# Patient Record
Sex: MALE | Race: BLACK OR AFRICAN AMERICAN | Employment: OTHER | URBAN - METROPOLITAN AREA
[De-identification: names, ages, dates, MRNs, and addresses within clinical notes are randomized per-mention and may not be internally consistent; named-entity substitution may affect disease eponyms.]

---

## 2017-11-12 ENCOUNTER — APPOINTMENT (OUTPATIENT)
Dept: GENERAL RADIOLOGY | Age: 77
End: 2017-11-12
Attending: PHYSICIAN ASSISTANT
Payer: MEDICARE

## 2017-11-12 ENCOUNTER — HOSPITAL ENCOUNTER (EMERGENCY)
Age: 77
Discharge: HOME | End: 2017-11-12
Attending: EMERGENCY MEDICINE
Payer: MEDICARE

## 2017-11-12 VITALS
TEMPERATURE: 98.1 F | HEIGHT: 69 IN | HEART RATE: 67 BPM | SYSTOLIC BLOOD PRESSURE: 134 MMHG | OXYGEN SATURATION: 98 % | DIASTOLIC BLOOD PRESSURE: 57 MMHG | BODY MASS INDEX: 25.11 KG/M2 | WEIGHT: 169.53 LBS | RESPIRATION RATE: 16 BRPM

## 2017-11-12 DIAGNOSIS — R53.83 FATIGUE, UNSPECIFIED TYPE: Primary | ICD-10-CM

## 2017-11-12 DIAGNOSIS — R05.9 COUGH: ICD-10-CM

## 2017-11-12 LAB
ALBUMIN SERPL-MCNC: 3.6 G/DL (ref 3.5–5)
ALBUMIN/GLOB SERPL: 1 {RATIO} (ref 1.1–2.2)
ALP SERPL-CCNC: 107 U/L (ref 45–117)
ALT SERPL-CCNC: 12 U/L (ref 12–78)
ANION GAP SERPL CALC-SCNC: 9 MMOL/L (ref 5–15)
AST SERPL-CCNC: 15 U/L (ref 15–37)
BASOPHILS # BLD: 0 K/UL (ref 0–0.1)
BASOPHILS NFR BLD: 0 % (ref 0–1)
BILIRUB SERPL-MCNC: 0.4 MG/DL (ref 0.2–1)
BUN SERPL-MCNC: 11 MG/DL (ref 6–20)
BUN/CREAT SERPL: 9 (ref 12–20)
CALCIUM SERPL-MCNC: 9.1 MG/DL (ref 8.5–10.1)
CHLORIDE SERPL-SCNC: 109 MMOL/L (ref 97–108)
CO2 SERPL-SCNC: 27 MMOL/L (ref 21–32)
CREAT SERPL-MCNC: 1.26 MG/DL (ref 0.7–1.3)
EOSINOPHIL # BLD: 0.2 K/UL (ref 0–0.4)
EOSINOPHIL NFR BLD: 3 % (ref 0–7)
ERYTHROCYTE [DISTWIDTH] IN BLOOD BY AUTOMATED COUNT: 14.3 % (ref 11.5–14.5)
GLOBULIN SER CALC-MCNC: 3.5 G/DL (ref 2–4)
GLUCOSE SERPL-MCNC: 86 MG/DL (ref 65–100)
HCT VFR BLD AUTO: 39 % (ref 36.6–50.3)
HGB BLD-MCNC: 13.4 G/DL (ref 12.1–17)
LYMPHOCYTES # BLD: 2.7 K/UL (ref 0.8–3.5)
LYMPHOCYTES NFR BLD: 32 % (ref 12–49)
MCH RBC QN AUTO: 33.7 PG (ref 26–34)
MCHC RBC AUTO-ENTMCNC: 34.4 G/DL (ref 30–36.5)
MCV RBC AUTO: 98 FL (ref 80–99)
MONOCYTES # BLD: 0.7 K/UL (ref 0–1)
MONOCYTES NFR BLD: 8 % (ref 5–13)
NEUTS SEG # BLD: 4.7 K/UL (ref 1.8–8)
NEUTS SEG NFR BLD: 57 % (ref 32–75)
PLATELET # BLD AUTO: 164 K/UL (ref 150–400)
POTASSIUM SERPL-SCNC: 4.5 MMOL/L (ref 3.5–5.1)
PROT SERPL-MCNC: 7.1 G/DL (ref 6.4–8.2)
RBC # BLD AUTO: 3.98 M/UL (ref 4.1–5.7)
SODIUM SERPL-SCNC: 145 MMOL/L (ref 136–145)
TSH SERPL DL<=0.05 MIU/L-ACNC: 1.54 UIU/ML (ref 0.36–3.74)
WBC # BLD AUTO: 8.3 K/UL (ref 4.1–11.1)

## 2017-11-12 PROCEDURE — 99282 EMERGENCY DEPT VISIT SF MDM: CPT

## 2017-11-12 PROCEDURE — 84443 ASSAY THYROID STIM HORMONE: CPT | Performed by: PHYSICIAN ASSISTANT

## 2017-11-12 PROCEDURE — 36415 COLL VENOUS BLD VENIPUNCTURE: CPT | Performed by: PHYSICIAN ASSISTANT

## 2017-11-12 PROCEDURE — 85025 COMPLETE CBC W/AUTO DIFF WBC: CPT | Performed by: PHYSICIAN ASSISTANT

## 2017-11-12 PROCEDURE — 80053 COMPREHEN METABOLIC PANEL: CPT | Performed by: PHYSICIAN ASSISTANT

## 2017-11-12 PROCEDURE — 71020 XR CHEST PA LAT: CPT
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
## 2017-11-12 NOTE — ED PROVIDER NOTES
HPI Comments: Florence Sagastume is a 78 y.o. male  who presents by private vehicle to ER with c/o Patient presents with:  Cough. Patient presents with 4 week history of cough, decreased appetite and weight loss. PAtient concerned he may have pneumonia. Has been taking mucinex and robitussin with no relief. PAtient denies fever or chills. Denies nausea, vomiting or diarrhea. He specifically denies any fevers, chills, nausea, vomiting, chest pain, shortness of breath, headache, rash, diarrhea, abdominal pain, urinary/bowel changes, sweating or weight loss. PCP: None   PMHx significant for: Past Medical History:  10/3/2014: CKD (chronic kidney disease) stage 3, GFR 30-5*  No date: Other ill-defined conditions      Comment: recent cold, taking benadryl for sinus                congestion  No date: Tobacco abuse   PSHx significant for: Past Surgical History:  No date: ABDOMEN SURGERY PROC UNLISTED  No date: HX HERNIA REPAIR  No date: HX OTHER SURGICAL      Comment: colonoscopy  Social Hx: Tobacco use: Smoking status: Current Every Day Smoker                                                   Packs/day: 0.25      Years: 0.00      Smokeless status: Never Used                      ; EtOH use: The patient states he drinks 0 per week.; Illicit Drug use: Allergies:  No Known Allergies    There are no other complaints, changes or physical findings at this time. Patient is a 78 y.o. male presenting with cough. The history is provided by the patient. Cough   This is a recurrent problem. The current episode started more than 1 week ago. The problem occurs constantly. The problem has not changed since onset. The cough is non-productive. There has been no fever.  Pertinent negatives include no chest pain, no chills, no sweats, no weight loss, no eye redness, no ear congestion, no ear pain, no headaches, no rhinorrhea, no sore throat, no myalgias, no shortness of breath, no wheezing, no nausea, no vomiting and no confusion. He has tried nothing for the symptoms. He is not a smoker. His past medical history is significant for pneumonia. Past Medical History:   Diagnosis Date    CKD (chronic kidney disease) stage 3, GFR 30-59 ml/min 10/3/2014    Other ill-defined conditions     recent cold, taking benadryl for sinus congestion    Tobacco abuse        Past Surgical History:   Procedure Laterality Date    ABDOMEN SURGERY PROC UNLISTED      HX HERNIA REPAIR      HX OTHER SURGICAL      colonoscopy         Family History:   Problem Relation Age of Onset    Other       patient does not know       Social History     Social History    Marital status:      Spouse name: N/A    Number of children: N/A    Years of education: N/A     Occupational History    Not on file. Social History Main Topics    Smoking status: Current Every Day Smoker     Packs/day: 0.25    Smokeless tobacco: Never Used    Alcohol use No    Drug use: No    Sexual activity: Not on file     Other Topics Concern    Not on file     Social History Narrative    No narrative on file         ALLERGIES: Review of patient's allergies indicates no known allergies. Review of Systems   Constitutional: Positive for activity change, appetite change and unexpected weight change. Negative for chills and weight loss. HENT: Negative. Negative for ear pain, rhinorrhea and sore throat. Eyes: Negative. Negative for redness. Respiratory: Positive for cough. Negative for shortness of breath and wheezing. Cardiovascular: Negative. Negative for chest pain. Gastrointestinal: Negative. Negative for nausea and vomiting. Endocrine: Negative. Genitourinary: Negative. Musculoskeletal: Negative. Negative for myalgias. Skin: Negative. Allergic/Immunologic: Negative. Neurological: Negative. Negative for headaches. Hematological: Negative. Psychiatric/Behavioral: Negative. Negative for confusion.    All other systems reviewed and are negative. Vitals:    11/12/17 1538   BP: 133/66   Pulse: 66   Resp: 16   Temp: 98.1 °F (36.7 °C)   SpO2: 99%   Weight: 76.9 kg (169 lb 8.5 oz)   Height: 5' 9\" (1.753 m)            Physical Exam   Constitutional: He is oriented to person, place, and time. Vital signs are normal. He appears well-developed and well-nourished. Non-toxic appearance. He does not have a sickly appearance. He does not appear ill. No distress. HENT:   Head: Normocephalic and atraumatic. Right Ear: External ear normal.   Left Ear: External ear normal.   Mouth/Throat: Oropharynx is clear and moist. No oropharyngeal exudate. Eyes: Conjunctivae and EOM are normal. Pupils are equal, round, and reactive to light. Right eye exhibits no discharge. Left eye exhibits no discharge. No scleral icterus. Neck: Normal range of motion. Neck supple. No tracheal deviation present. No thyromegaly present. Cardiovascular: Normal rate, regular rhythm, normal heart sounds and intact distal pulses. No murmur heard. Pulmonary/Chest: Effort normal and breath sounds normal. No respiratory distress. He has no wheezes. He has no rales. Abdominal: Soft. Bowel sounds are normal. He exhibits no distension. There is no tenderness. There is no rebound and no guarding. Musculoskeletal: Normal range of motion. He exhibits no edema or tenderness. Lymphadenopathy:     He has no cervical adenopathy. Neurological: He is alert and oriented to person, place, and time. No cranial nerve deficit. Coordination normal.   Skin: Skin is warm. No rash noted. No erythema. Psychiatric: He has a normal mood and affect. His behavior is normal. Judgment and thought content normal.   Nursing note and vitals reviewed. MDM  Number of Diagnoses or Management Options  Cough:   Fatigue, unspecified type:   Diagnosis management comments: Assesment/Plan- 78 y.o.  Patient presents with:  Cough  differential includes: pneumonia, electrolyte imbalance, thyroid abnormality. Labs and imaging reviewed with no acute findings. Recommend PCP follow up. Patient educated on reasons to return to the ED.          Amount and/or Complexity of Data Reviewed  Clinical lab tests: ordered and reviewed  Tests in the radiology section of CPT®: ordered and reviewed  Tests in the medicine section of CPT®: reviewed and ordered      ED Course       Procedures

## 2017-12-06 ENCOUNTER — OFFICE VISIT (OUTPATIENT)
Dept: FAMILY MEDICINE CLINIC | Age: 77
End: 2017-12-06

## 2017-12-06 VITALS
HEIGHT: 69 IN | HEART RATE: 73 BPM | TEMPERATURE: 98.3 F | WEIGHT: 170 LBS | SYSTOLIC BLOOD PRESSURE: 127 MMHG | DIASTOLIC BLOOD PRESSURE: 64 MMHG | RESPIRATION RATE: 16 BRPM | BODY MASS INDEX: 25.18 KG/M2

## 2017-12-06 DIAGNOSIS — H25.9 SENILE CATARACT OF RIGHT EYE, UNSPECIFIED AGE-RELATED CATARACT TYPE: Primary | ICD-10-CM

## 2017-12-06 DIAGNOSIS — J30.9 ALLERGIC RHINITIS, UNSPECIFIED CHRONICITY, UNSPECIFIED SEASONALITY, UNSPECIFIED TRIGGER: ICD-10-CM

## 2017-12-06 RX ORDER — AZITHROMYCIN 500 MG/1
TABLET, FILM COATED ORAL DAILY
COMMUNITY
End: 2017-12-06 | Stop reason: ALTCHOICE

## 2017-12-06 RX ORDER — FLUTICASONE PROPIONATE 50 MCG
2 SPRAY, SUSPENSION (ML) NASAL DAILY
Qty: 1 BOTTLE | Refills: 3 | Status: SHIPPED | OUTPATIENT
Start: 2017-12-06 | End: 2018-07-16

## 2017-12-06 NOTE — PROGRESS NOTES
Preoperative Evaluation    Date of Exam: 2017    Migdalia Mitchell is a 78 y.o. male (:1940) who presents for preoperative evaluation. Procedure/Surgery: R cataract extraction and implant    Date of Procedure/Surgery: 17    Surgeon: Flaca Subramanian    [facility]    Primary Physician: None    Latex Allergy: no    Recent use of: No recent use of aspirin (ASA), NSAIDS or steroids    Tetanus up to date: thinks that he has had this    Anesthesia Complications: None    History of abnormal bleeding : None    History of Blood Transfusions: no    Health Care Directive or Living Will: no    He able to climb a flight of stairs without chest pain or severe SOB      Allergies- reviewed:   No Known Allergies    Medications- reviewed:   Current Outpatient Prescriptions   Medication Sig    fluticasone (FLONASE) 50 mcg/actuation nasal spray 2 Sprays by Both Nostrils route daily. No current facility-administered medications for this visit. Past Medical History- reviewed:  Past Medical History:   Diagnosis Date    CKD (chronic kidney disease) stage 3, GFR 30-59 ml/min 10/3/2014    Other ill-defined conditions(799.89)     recent cold, taking benadryl for sinus congestion    Tobacco abuse        Past Surgical History- reviewed:   Past Surgical History:   Procedure Laterality Date    ABDOMEN SURGERY PROC UNLISTED      HX HERNIA REPAIR      HX OTHER SURGICAL      colonoscopy       Social History- reviewed:  Social History     Social History    Marital status: SINGLE     Spouse name: N/A    Number of children: N/A    Years of education: N/A     Occupational History    Not on file.      Social History Main Topics    Smoking status: Current Every Day Smoker     Packs/day: 0.25    Smokeless tobacco: Never Used    Alcohol use No    Drug use: No    Sexual activity: Not on file     Other Topics Concern    Not on file     Social History Narrative Immunizations- reviewed:   Immunization History   Administered Date(s) Administered    Pneumococcal Polysaccharide (PPSV-23) 10/06/2014    ZZZ-RETIRED (DO NOT USE) Pneumococcal Vaccine (Unspecified Type) 10/08/2011         REVIEW OF SYSTEMS:  CONSTITUTIONAL: Denies: fever, fatigue, weight loss  EYES: Denies: decreased vision  ENT: Denies: sore throat  CARDIOVASCULAR: Denies: chest pain  RESPIRATORY: Denies: shortness of breath  ENDOCRINE: Denies: palpitations  GI: Denies: abdominal pain  : Denies: dysuria  NEURO: Denies: headache  MUSCULOSKELETAL: Denies: back pain  SKIN: Denies: rash  PSYCH: Denies: depression      EXAM:   Visit Vitals    /64    Pulse 73    Temp 98.3 °F (36.8 °C) (Oral)    Resp 16    Ht 5' 9\" (1.753 m)    Wt 170 lb (77.1 kg)    BMI 25.1 kg/m2       General appearance - alert, well appearing, and in no distress  Eyes - pupils equal and reactive, extraocular eye movements intact  Ears - bilateral TM's and external ear canals normal  Nose - normal and patent, no erythema, discharge or polyps  Mouth - mucous membranes moist, pharynx normal without lesions  Neck - supple, no significant adenopathy  Chest - clear to auscultation, no wheezes, rales or rhonchi, symmetric air entry  Heart - normal rate, regular rhythm, normal S1, S2, no murmurs  Abdomen - soft, nontender, nondistended  Neurological - alert, oriented, normal speech, no focal findings or movement disorder noted  Musculoskeletal - no joint tenderness, deformity or swelling  Extremities - no pedal edema  Skin - normal coloration and turgor, no rashes      DIAGNOSTICS: no diagnostic testing necessary      IMPRESSION:     Pt presents for preoperative evaluation for cataract surgery and is an acceptable candidate for this low risk surgery.        Sal Messer MD  Family Medicine Resident

## 2017-12-06 NOTE — PROGRESS NOTES
Chief Complaint   Patient presents with    Pre-op Exam     cataract--right eye     1. Have you been to the ER, urgent care clinic since your last visit? Hospitalized since your last visit? No    2. Have you seen or consulted any other health care providers outside of the [address] [place] [address] since your last visit? Include any pap smears or colon screening.  No

## 2017-12-06 NOTE — MR AVS SNAPSHOT
Visit Information Date & Time Provider Department Dept. Phone Encounter #  
 2017  8:35 AM Natalie Thapa, [address] [facility] [phone] [id number] Upcoming Health Maintenance Date Due DTaP/Tdap/Td series (1 - Tdap) 1961 ZOSTER VACCINE AGE 60> 2002 GLAUCOMA SCREENING Q2Y 2007 MEDICARE YEARLY EXAM 2007 Influenza Age 5 to Adult 2017 Allergies as of 2017  Review Complete On: 2017 By: Tabitha Callaway MD  
 No Known Allergies Current Immunizations  Reviewed on 2012 Name Date Pneumococcal Polysaccharide (PPSV-23) 10/6/2014  6:19 PM  
 ZZZ-RETIRED (DO NOT USE) Pneumococcal Vaccine (Unspecified Type) 10/8/2011 Not reviewed this visit You Were Diagnosed With   
  
 Codes Comments Allergic rhinitis, unspecified chronicity, unspecified seasonality, unspecified trigger    -  Primary ICD-10-CM: J30.9 ICD-9-CM: 477.9 Vitals BP Pulse Temp Resp Height(growth percentile) Weight(growth percentile) 127/64 73 98.3 °F (36.8 °C) (Oral) 16 5' 9\" (1.753 m) 170 lb (77.1 kg) BMI Smoking Status 25.1 kg/m2 Current Every Day Smoker Vitals History BMI and BSA Data Body Mass Index Body Surface Area  
 25.1 kg/m 2 1.94 m 2 Preferred Pharmacy Pharmacy Name Phone [facility] [place], [address] [place] [facility] [phone] Your Updated Medication List  
  
   
This list is accurate as of: 17  9:06 AM.  Always use your most recent med list.  
  
  
  
  
 fluticasone 50 mcg/actuation nasal spray Commonly known as:  Jero Metro 2 Sprays by Both Nostrils route daily. Prescriptions Sent to Pharmacy Refills  
 fluticasone (FLONASE) 50 mcg/actuation nasal spray 3 Si Sprays by Both Nostrils route daily.   
 Class: Normal  
 Pharmacy: [facility] [address]  #: [phone] Route: Both Nostrils Patient Instructions Eating Healthy Foods: Care Instructions Your Care Instructions Eating healthy foods can help lower your risk for disease. Healthy food gives you energy and keeps your heart strong, your brain active, your muscles working, and your bones strong. A healthy diet includes a variety of foods from the basic food groups: grains, vegetables, fruits, milk and milk products, and meat and beans. Some people may eat more of their favorite foods from only one food group and, as a result, miss getting the nutrients they need. So, it is important to pay attention not only to what you eat but also to what you are missing from your diet. You can eat a healthy, balanced diet by making a few small changes. Follow-up care is a key part of your treatment and safety. Be sure to make and go to all appointments, and call your doctor if you are having problems. It's also a good idea to know your test results and keep a list of the medicines you take. How can you care for yourself at home? Look at what you eat · Keep a food diary for a week or two and record everything you eat or drink. Track the number of servings you eat from each food group. · For a balanced diet every day, eat a variety of: ¨ 6 or more ounce-equivalents of grains, such as cereals, breads, crackers, rice, or pasta, every day. An ounce-equivalent is 1 slice of bread, 1 cup of ready-to-eat cereal, or ½ cup of cooked rice, cooked pasta, or cooked cereal. 
¨ 2½ cups of vegetables, especially: § Dark-green vegetables such as broccoli and spinach. § Orange vegetables such as carrots and sweet potatoes. § Dry beans (such as heck and kidney beans) and peas (such as lentils). ¨ 2 cups of fresh, frozen, or canned fruit. A small apple or 1 banana or orange equals 1 cup. ¨ 3 cups of nonfat or low-fat milk, yogurt, or other milk products. ¨ 5½ ounces of meat and beans, such as chicken, fish, lean meat, beans, nuts, and seeds. One egg, 1 tablespoon of peanut butter, ½ ounce nuts or seeds, or ¼ cup of cooked beans equals 1 ounce of meat. · Learn how to read food labels for serving sizes and ingredients. Fast-food and convenience-food meals often contain few or no fruits or vegetables. Make sure you eat some fruits and vegetables to make the meal more nutritious. · Look at your food diary. For each food group, add up what you have eaten and then divide the total by the number of days. This will give you an idea of how much you are eating from each food group. See if you can find some ways to change your diet to make it more healthy. Start small · Do not try to make dramatic changes to your diet all at once. You might feel that you are missing out on your favorite foods and then be more likely to fail. · Start slowly, and gradually change your habits. Try some of the following: ¨ Use whole wheat bread instead of white bread. ¨ Use nonfat or low-fat milk instead of whole milk. ¨ Eat brown rice instead of white rice, and eat whole wheat pasta instead of white-flour pasta. ¨ Try low-fat cheeses and low-fat yogurt. ¨ Add more fruits and vegetables to meals and have them for snacks. ¨ Add lettuce, tomato, cucumber, and onion to sandwiches. ¨ Add fruit to yogurt and cereal. 
Enjoy food · You can still eat your favorite foods. You just may need to eat less of them. If your favorite foods are high in fat, salt, and sugar, limit how often you eat them, but do not cut them out entirely. · Eat a wide variety of foods. Make healthy choices when eating out · The type of restaurant you choose can help you make healthy choices. Even fast-food chains are now offering more low-fat or healthier choices on the menu. · Choose smaller portions, or take half of your meal home. · When eating out, try: ¨ A veggie pizza with a whole wheat crust or grilled chicken (instead of sausage or pepperoni). ¨ Pasta with roasted vegetables, grilled chicken, or marinara sauce instead of cream sauce. ¨ A vegetable wrap or grilled chicken wrap. ¨ Broiled or poached food instead of fried or breaded items. Make healthy choices easy · Buy packaged, prewashed, ready-to-eat fresh vegetables and fruits, such as baby carrots, salad mixes, and chopped or shredded broccoli and cauliflower. · Buy packaged, presliced fruits, such as melon or pineapple. · Choose 100% fruit or vegetable juice instead of soda. Limit juice intake to 4 to 6 oz (½ to ¾ cup) a day. · Blend low-fat yogurt, fruit juice, and canned or frozen fruit to make a smoothie for breakfast or a snack. Where can you learn more? Go to http://leslie-josue.info/. Enter T756 in the search box to learn more about \"Eating Healthy Foods: Care Instructions. \" Current as of: May 12, 2017 Content Version: 11.4 © 2338-7131 [employer]. Care instructions adapted under license by Not iT (which disclaims liability or warranty for this information). If you have questions about a medical condition or this instruction, always ask your healthcare professional. [place] [address] any warranty or liability for your use of this information. Introducing [facility] & HE[facility]! Desmond Ross introduces [employer] patient portal. Now you can access parts of your medical record, email your doctor's office, and request medication refills online. 1. In your internet browser, go to [email or web address] ([email or web address] 2. Click on the First Time User? Click Here link in the Sign In box. You will see the New Member Sign Up page. 3. Enter your [employer] Access Code exactly as it appears below. You will not need to use this code after youve completed the sign-up process.  If you do not sign up before the expiration date, you must request a new code. · [employer] Access Code: [email or web address] Expires: 2/10/2018  6:17 PM 
 
4. Enter the last four digits of your Social Security Number (xxxx) and Date of Birth (mm/dd/yyyy) as indicated and click Submit. You will be taken to the next sign-up page. 5. Create a [employer] ID. This will be your [employer] login ID and cannot be changed, so think of one that is secure and easy to remember. 6. Create a [employer] password. You can change your password at any time. 7. Enter your Password Reset Question and Answer. This can be used at a later time if you forget your password. 8. Enter your e-mail address. You will receive e-mail notification when new information is available in [address] 9. Click Sign Up. You can now view and download portions of your medical record. 10. Click the Download Summary menu link to download a portable copy of your medical information. If you have questions, please visit the Frequently Asked Questions section of the [employer] website. Remember, [employer] is NOT to be used for urgent needs. For medical emergencies, dial 911. Now available from your iPhone and Android! Please provide this summary of care documentation to your next provider. Your primary care clinician is listed as NONE. If you have any questions after today's visit, please call [phone].

## 2017-12-06 NOTE — PATIENT INSTRUCTIONS

## 2018-03-05 ENCOUNTER — HOSPITAL ENCOUNTER (EMERGENCY)
Age: 78
Discharge: HOME | End: 2018-03-05
Attending: EMERGENCY MEDICINE
Payer: MEDICARE

## 2018-03-05 VITALS
OXYGEN SATURATION: 100 % | RESPIRATION RATE: 17 BRPM | DIASTOLIC BLOOD PRESSURE: 62 MMHG | WEIGHT: 171.74 LBS | HEART RATE: 72 BPM | SYSTOLIC BLOOD PRESSURE: 125 MMHG | BODY MASS INDEX: 25.44 KG/M2 | HEIGHT: 69 IN | TEMPERATURE: 98.4 F

## 2018-03-05 DIAGNOSIS — G50.1 ATYPICAL FACE PAIN: Primary | ICD-10-CM

## 2018-03-05 DIAGNOSIS — K04.7 DENTAL ABSCESS: ICD-10-CM

## 2018-03-05 PROCEDURE — 74011250637 HC RX REV CODE- 250/637: Performed by: EMERGENCY MEDICINE

## 2018-03-05 PROCEDURE — 99283 EMERGENCY DEPT VISIT LOW MDM: CPT

## 2018-03-05 PROCEDURE — 74011000250 HC RX REV CODE- 250: Performed by: EMERGENCY MEDICINE

## 2018-03-05 RX ORDER — CLINDAMYCIN HYDROCHLORIDE 150 MG/1
300 CAPSULE ORAL
Status: COMPLETED | OUTPATIENT
Start: 2018-03-05 | End: 2018-03-05

## 2018-03-05 RX ORDER — CLINDAMYCIN HYDROCHLORIDE 300 MG/1
300 CAPSULE ORAL 4 TIMES DAILY
Qty: 28 CAP | Refills: 0 | Status: SHIPPED | OUTPATIENT
Start: 2018-03-05 | End: 2018-07-16

## 2018-03-05 RX ORDER — HYDROCODONE BITARTRATE AND ACETAMINOPHEN 5; 325 MG/1; MG/1
1 TABLET ORAL
Qty: 12 TAB | Refills: 0 | Status: SHIPPED | OUTPATIENT
Start: 2018-03-05 | End: 2018-07-16

## 2018-03-05 RX ADMIN — LIDOCAINE HYDROCHLORIDE: 20 SOLUTION ORAL; TOPICAL at 17:42

## 2018-03-05 RX ADMIN — CLINDAMYCIN HYDROCHLORIDE 300 MG: 150 CAPSULE ORAL at 17:42

## 2018-03-05 NOTE — DISCHARGE INSTRUCTIONS
We hope that we have addressed all of your medical concerns. The examination and treatment you received in the Emergency Department were for an emergent problem and were not intended as complete care. It is important that you follow up with your healthcare provider(s) for ongoing care. If your symptoms worsen or do not improve as expected, and you are unable to reach your usual health care provider(s), you should return to the Emergency Department. Today's healthcare is undergoing tremendous change, and patient satisfaction surveys are one of the many tools to assess the quality of medical care. You may receive a survey from the [employer] regarding your experience in the Emergency Department. I hope that your experience has been completely positive, particularly the medical care that I provided. As such, please participate in the survey; anything less than excellent does not meet my expectations or intentions. [facility] and [address] [place] [address] participate in nationally recognized quality of care measures. If your blood pressure is greater than 120/80, as reported below, we urge that you seek medical care to address the potential of high blood pressure, commonly known as hypertension. Hypertension can be hereditary or can be caused by certain medical conditions, pain, stress, or \"white coat syndrome. \"       Please make an appointment with your health care provider(s) for follow up of your Emergency Department visit. VITALS:   Patient Vitals for the past 8 hrs:   Temp Pulse Resp BP SpO2   03/05/18 1548 98.4 °F (36.9 °C) 72 17 125/62 100 %          Thank you for allowing us to provide you with medical care today. We realize that you have many choices for your emergency care needs. Please choose us in the future for any continued health care needs.       Nathalia Chaudhari MD    [facility]   Office: [phone]            No results found for this or any previous visit (from the past 24 hour(s)). No results found. Abscessed Tooth: Care Instructions  Your Care Instructions    An abscessed tooth is a tooth that has a pocket of pus in the tissues around it. Pus forms when the body tries to fight an infection caused by bacteria. If the pus cannot drain, it forms an abscess. An abscessed tooth can cause red, swollen gums and throbbing pain, especially when you chew. You may have a bad taste in your mouth and a fever, and your jaw may swell. Damage to the tooth, untreated tooth decay, or gum disease can cause an abscessed tooth. An abscessed tooth needs to be treated by a dental professional right away. If it is not treated, the infection could spread to other parts of your body. Your dentist will give you antibiotics to stop the infection. He or she may make a hole in the tooth or cut open (aminata) the abscess inside your mouth so that the infection can drain, which should relieve your pain. You may need to have a root canal treatment, which tries to save your tooth by taking out the infected pulp and replacing it with a healing medicine and/or a filling. If these treatments do not work, your tooth may have to be removed. Follow-up care is a key part of your treatment and safety. Be sure to make and go to all appointments, and call your doctor if you are having problems. It's also a good idea to know your test results and keep a list of the medicines you take. How can you care for yourself at home? · Reduce pain and swelling in your face and jaw by putting ice or a cold pack on the outside of your cheek for 10 to 20 minutes at a time. Put a thin cloth between the ice and your skin. · Take pain medicines exactly as directed. ¨ If the doctor gave you a prescription medicine for pain, take it as prescribed.   ¨ If you are not taking a prescription pain medicine, ask your doctor if you can take an over-the-counter medicine. · Take your antibiotics as directed. Do not stop taking them just because you feel better. You need to take the full course of antibiotics. To prevent tooth abscess  · Brush and floss every day, and have regular dental checkups. · Eat a healthy diet, and avoid sugary foods and drinks. · Do not smoke, use e-cigarettes with nicotine, or use spit tobacco. Tobacco and nicotine slow your ability to heal. Tobacco also increases your risk for gum disease and cancer of the mouth and throat. If you need help quitting, talk to your doctor about stop-smoking programs and medicines. These can increase your chances of quitting for good. When should you call for help? Call 911 anytime you think you may need emergency care. For example, call if:  ? · You have trouble breathing. ?Call your doctor now or seek immediate medical care if:  ? · You have new or worse symptoms of infection, such as:  ¨ Increased pain, swelling, warmth, or redness. ¨ Red streaks leading from the area. ¨ Pus draining from the area. ¨ A fever. ? Watch closely for changes in your health, and be sure to contact your doctor if:  ? · You do not get better as expected. Where can you learn more? Go to http://leslie-josue.info/. Enter N341 in the search box to learn more about \"Abscessed Tooth: Care Instructions. \"  Current as of: May 12, 2017  Content Version: 11.4  © 8895-8413 [employer]. Care instructions adapted under license by [employer] (which disclaims liability or warranty for this information). If you have questions about a medical condition or this instruction, always ask your healthcare professional. [place] [address] any warranty or liability for your use of this information.

## 2018-07-16 ENCOUNTER — HOSPITAL ENCOUNTER (EMERGENCY)
Age: 78
Discharge: HOME | End: 2018-07-16
Attending: EMERGENCY MEDICINE | Admitting: EMERGENCY MEDICINE
Payer: MEDICARE

## 2018-07-16 VITALS
WEIGHT: 165 LBS | RESPIRATION RATE: 14 BRPM | TEMPERATURE: 98.5 F | HEIGHT: 69 IN | BODY MASS INDEX: 24.44 KG/M2 | OXYGEN SATURATION: 99 % | HEART RATE: 80 BPM | DIASTOLIC BLOOD PRESSURE: 56 MMHG | SYSTOLIC BLOOD PRESSURE: 128 MMHG

## 2018-07-16 DIAGNOSIS — L02.212 ABSCESS OF BACK: ICD-10-CM

## 2018-07-16 DIAGNOSIS — L72.3 INFECTED SEBACEOUS CYST OF SKIN: Primary | ICD-10-CM

## 2018-07-16 DIAGNOSIS — L08.9 INFECTED SEBACEOUS CYST OF SKIN: Primary | ICD-10-CM

## 2018-07-16 DIAGNOSIS — F17.200 NICOTINE DEPENDENCE, UNCOMPLICATED, UNSPECIFIED NICOTINE PRODUCT TYPE: ICD-10-CM

## 2018-07-16 PROCEDURE — 74011000250 HC RX REV CODE- 250: Performed by: PHYSICIAN ASSISTANT

## 2018-07-16 PROCEDURE — 90715 TDAP VACCINE 7 YRS/> IM: CPT | Performed by: PHYSICIAN ASSISTANT

## 2018-07-16 PROCEDURE — 74011250636 HC RX REV CODE- 250/636: Performed by: PHYSICIAN ASSISTANT

## 2018-07-16 PROCEDURE — 75810000289 HC I&D ABSCESS SIMP/COMP/MULT

## 2018-07-16 PROCEDURE — 90471 IMMUNIZATION ADMIN: CPT

## 2018-07-16 PROCEDURE — 99282 EMERGENCY DEPT VISIT SF MDM: CPT

## 2018-07-16 RX ORDER — DOXYCYCLINE HYCLATE 100 MG
100 TABLET ORAL 2 TIMES DAILY
Qty: 20 TAB | Refills: 0 | Status: SHIPPED | OUTPATIENT
Start: 2018-07-16 | End: 2018-07-26

## 2018-07-16 RX ORDER — TRAMADOL HYDROCHLORIDE 50 MG/1
50 TABLET ORAL
Qty: 10 TAB | Refills: 0 | Status: SHIPPED | OUTPATIENT
Start: 2018-07-16 | End: 2019-01-06

## 2018-07-16 RX ORDER — BUPIVACAINE HYDROCHLORIDE 5 MG/ML
5 INJECTION, SOLUTION EPIDURAL; INTRACAUDAL ONCE
Status: COMPLETED | OUTPATIENT
Start: 2018-07-16 | End: 2018-07-16

## 2018-07-16 RX ADMIN — TETANUS TOXOID, REDUCED DIPHTHERIA TOXOID AND ACELLULAR PERTUSSIS VACCINE, ADSORBED 0.5 ML: 5; 2.5; 8; 8; 2.5 SUSPENSION INTRAMUSCULAR at 14:52

## 2018-07-16 RX ADMIN — BUPIVACAINE HYDROCHLORIDE 25 MG: 5 INJECTION, SOLUTION EPIDURAL; INTRACAUDAL; PERINEURAL at 14:39

## 2018-07-16 NOTE — ED PROVIDER NOTES
HPI Comments: Demetris Tabor is a 78 y.o. male who presents ambulatory to the ED with a c/o abscess to his upper back x weeks. Pt notes he has no insurance and could not see his pcp. He states it has been worse x 4 days. He is unsure of his last tdap. He specifically denies any fevers, chills, nausea, vomiting, chest pain, shortness of breath, headache, rash, abd pain, dysuria, diarrhea, sweating or weight loss. PCP: None  PMHx significant for: Past Medical History:  10/3/2014: CKD (chronic kidney disease) stage 3, GFR 30-5*  No date: Other ill-defined conditions(799.89)      Comment: recent cold, taking benadryl for sinus                congestion  No date: Tobacco abuse  PSHx significant for: Past Surgical History:  No date: ABDOMEN SURGERY PROC UNLISTED  No date: HX HERNIA REPAIR  No date: HX OTHER SURGICAL      Comment: colonoscopy  Social Hx: Tobacco: 1/3 ppd  EtOH: social Illicit drug use: denies     There are no further complaints or symptoms at this time. The history is provided by the patient. Past Medical History:   Diagnosis Date    CKD (chronic kidney disease) stage 3, GFR 30-59 ml/min 10/3/2014    Other ill-defined conditions(799.89)     recent cold, taking benadryl for sinus congestion    Tobacco abuse        Past Surgical History:   Procedure Laterality Date    ABDOMEN SURGERY PROC UNLISTED      HX HERNIA REPAIR      HX OTHER SURGICAL      colonoscopy         Family History:   Problem Relation Age of Onset    Other Other      patient does not know       Social History     Social History    Marital status: SINGLE     Spouse name: N/A    Number of children: N/A    Years of education: N/A     Occupational History    Not on file.      Social History Main Topics    Smoking status: Current Every Day Smoker     Packs/day: 0.25    Smokeless tobacco: Never Used    Alcohol use No    Drug use: No    Sexual activity: Not on file     Other Topics Concern    Not on file Social History Narrative         ALLERGIES: Review of patient's allergies indicates no known allergies. Review of Systems   Constitutional: Negative for chills and fever. HENT: Negative for congestion, rhinorrhea, sneezing and sore throat. Eyes: Negative for redness and visual disturbance. Respiratory: Negative for shortness of breath. Cardiovascular: Negative for chest pain and leg swelling. Gastrointestinal: Negative for abdominal pain, nausea and vomiting. Genitourinary: Negative for difficulty urinating and frequency. Musculoskeletal: Negative for back pain, myalgias and neck stiffness. Skin: Positive for rash and wound. Neurological: Negative for dizziness, syncope, weakness and headaches. Hematological: Negative for adenopathy. Vitals:    07/16/18 1357   BP: 128/56   Pulse: 80   Resp: 14   Temp: 98.5 °F (36.9 °C)   SpO2: 99%   Weight: 74.8 kg (165 lb)   Height: 5' 9\" (1.753 m)            Physical Exam   Constitutional: He is oriented to person, place, and time. He appears well-developed and well-nourished. No distress. HENT:   Head: Normocephalic and atraumatic. Right Ear: External ear normal.   Left Ear: External ear normal.   Eyes: EOM are normal. Pupils are equal, round, and reactive to light. Neck: Neck supple. Cardiovascular: Normal rate, regular rhythm, normal heart sounds and intact distal pulses. Exam reveals no gallop and no friction rub. No murmur heard. Pulmonary/Chest: Effort normal and breath sounds normal. No stridor. No respiratory distress. He has no wheezes. He has no rales. He exhibits no tenderness. Abdominal: Soft. Bowel sounds are normal. He exhibits no distension and no mass. There is no tenderness. There is no rebound and no guarding. Musculoskeletal: Normal range of motion. He exhibits no edema, tenderness or deformity. Neurological: He is alert and oriented to person, place, and time. No cranial nerve deficit.  Coordination normal. Skin: Rash noted. There is erythema. No pallor. 50 cent sized abscess/ infected sebaceous cyst to upper back TTP with fluctuance no surrounding erythema, swelling or drainage. Psychiatric: He has a normal mood and affect. His behavior is normal.   Nursing note and vitals reviewed. MDM  Number of Diagnoses or Management Options     Amount and/or Complexity of Data Reviewed  Review and summarize past medical records: yes  Independent visualization of images, tracings, or specimens: yes    Patient Progress  Patient progress: stable        ED Course       Procedures    2:37 PM  Discussed pt, sx, hx and current findings with Fredi Doty MD. He is in agreement with plan. Will update tdap and I&D abscess  Yareli Paredes. LUCIANA Melton    Procedure Note - Incision and Drainage:   3:29 PM  Performed by: Yareli Paredes. LUCIANA Melton  Complexity: complex  Skin prepped with surecleans. Sterile field established. Anesthesia achieved with 5 mLs of Bupivacaine 0.5% without epinephrine using a local infiltration. Abscess to back was incised with # 11 blade, and 5 mLs of purulent bloody drainage was expressed. Wound probed and irrigated. Area was packed using 1/4 inch iodoform gauze. Sterile dressing applied. Estimated blood loss: less than 2mL  The procedure took 17 minutes, and pt tolerated well. Yareli Paredes. LUCIANA Melton    LABORATORY TESTS:  No results found for this or any previous visit (from the past 12 hour(s)). IMAGING RESULTS:    No results found. MEDICATIONS GIVEN:  Medications   bupivacaine (PF) (MARCAINE) 0.5 % (5 mg/mL) injection 25 mg (not administered)   diph,Pertuss(AC),Tet Vac-PF (BOOSTRIX) suspension 0.5 mL (0.5 mL IntraMUSCular Given 7/16/18 8987)       IMPRESSION:  1. Infected sebaceous cyst of skin    2. Abscess of back    3. Nicotine dependence, uncomplicated, unspecified nicotine product type        PLAN:  1.    Current Discharge Medication List      START taking these medications    Details doxycycline (VIBRA-TABS) 100 mg tablet Take 1 Tab by mouth two (2) times a day for 10 days. Qty: 20 Tab, Refills: 0      traMADol (ULTRAM) 50 mg tablet Take 1 Tab by mouth every six (6) hours as needed for Pain. Max Daily Amount: 200 mg. Indications: Pain  Qty: 10 Tab, Refills: 0    Associated Diagnoses: Abscess of back; Infected sebaceous cyst of skin           2. Follow-up Information     Follow up With Details Comments Contact Info    [facility] EMERGENCY DEPT Schedule an appointment as soon as possible for a visit 2 days for recheck and packing removal Castillo Bossman [address]i [phone] Schedule an appointment as soon as possible for a visit 2 days for recheck and packing removal Nuno Roscoe [address]  [phone]        Return to ED if worse         3:31 PM  Pt has been reexamined. Pt has no new complaints, changes or physical findings. Care plan outlined and precautions discussed. All available results were reviewed with pt. All medications were reviewed with pt. All of pt's questions and concerns were addressed. Pt agrees to F/U as instructed and agrees to return to ED upon further deterioration. Pt is ready to go home.   RADHA Mathias

## 2018-07-16 NOTE — DISCHARGE INSTRUCTIONS
Skin Abscess: Care Instructions  Your Care Instructions    A skin abscess is a bacterial infection that forms a pocket of pus. A boil is a kind of skin abscess. The doctor may have cut an opening in the abscess so that the pus can drain out. You may have gauze in the cut so that the abscess will stay open and keep draining. You may need antibiotics. You will need to follow up with your doctor to make sure the infection has gone away. The doctor has checked you carefully, but problems can develop later. If you notice any problems or new symptoms, get medical treatment right away. Follow-up care is a key part of your treatment and safety. Be sure to make and go to all appointments, and call your doctor if you are having problems. It's also a good idea to know your test results and keep a list of the medicines you take. How can you care for yourself at home? · Apply warm and dry compresses, a heating pad set on low, or a hot water bottle 3 or 4 times a day for pain. Keep a cloth between the heat source and your skin. · If your doctor prescribed antibiotics, take them as directed. Do not stop taking them just because you feel better. You need to take the full course of antibiotics. · Take pain medicines exactly as directed. ¨ If the doctor gave you a prescription medicine for pain, take it as prescribed. ¨ If you are not taking a prescription pain medicine, ask your doctor if you can take an over-the-counter medicine. · Keep your bandage clean and dry. Change the bandage whenever it gets wet or dirty, or at least one time a day. · If the abscess was packed with gauze:  ¨ Keep follow-up appointments to have the gauze changed or removed. If the doctor instructed you to remove the gauze, gently pull out all of the gauze when your doctor tells you to. ¨ After the gauze is removed, soak the area in warm water for 15 to 20 minutes 2 times a day, until the wound closes. When should you call for help?   Call your doctor now or seek immediate medical care if:    · You have signs of worsening infection, such as:  ¨ Increased pain, swelling, warmth, or redness. ¨ Red streaks leading from the infected skin. ¨ Pus draining from the wound. ¨ A fever.    Watch closely for changes in your health, and be sure to contact your doctor if:    · You do not get better as expected. Where can you learn more? Go to http://leslie-josue.info/. Enter L048 in the search box to learn more about \"Skin Abscess: Care Instructions. \"  Current as of: May 10, 2017  Content Version: 11.7  © 3662-2917 [employer]. Care instructions adapted under license by [employer] (which disclaims liability or warranty for this information). If you have questions about a medical condition or this instruction, always ask your healthcare professional. [address] any warranty or liability for your use of this information. Epidermoid Cyst: Care Instructions  Your Care Instructions  An epidermoid (say \"rr-sab-JZG-moyd\") cyst is a lump just under the skin. These cysts can form when a hair follicle becomes blocked. They are common in acne and may occur on the face, neck, back, and genitals. However, they can form anywhere on the body. These cysts are not cancer and do not lead to cancer. They tend not to hurt, but they can sometimes become swollen and painful. They also may break open (rupture) and cause scarring. These cysts sometimes do not cause problems and may not need treatment. If you have a cyst that is swollen and hurts, your doctor may inject it with a medicine to help it heal. But it is more likely that a painful cyst will need to be removed. Your doctor will give you a shot of numbing medicine and cut into the cyst to drain it or remove it. This makes the symptoms go away. Follow-up care is a key part of your treatment and safety.  Be sure to make and go to all appointments, and call your doctor if you are having problems. It's also a good idea to know your test results and keep a list of the medicines you take. How can you care for yourself at home? · Do not squeeze the cyst or poke it with a needle to open it. This can cause swelling, redness, and infection. · Always have a doctor look at any new lumps you get to make sure that they are not serious. When should you call for help? Watch closely for changes in your health, and be sure to contact your doctor if:    · You have a fever, redness, or swelling after you get a shot of medicine in the cyst.     · You see or feel a new lump on your skin. Where can you learn more? Go to http://leslie-josue.info/. Enter H910 in the search box to learn more about \"Epidermoid Cyst: Care Instructions. \"  Current as of: October 5, 2017  Content Version: 11.7  © 1879-0045 [employer] Care instructions adapted under license by [employer] (which disclaims liability or warranty for this information). If you have questions about a medical condition or this instruction, always ask your healthcare professional. [address] any warranty or liability for your use of this information. Learning About Benefits From Quitting Smoking  How does quitting smoking make you healthier? If you're thinking about quitting smoking, you may have a few reasons to be smoke-free. Your health may be one of them. · When you quit smoking, you lower your risks for cancer, lung disease, heart attack, stroke, blood vessel disease, and blindness from macular degeneration. · When you're smoke-free, you get sick less often, and you heal faster. You are less likely to get colds, flu, bronchitis, and pneumonia. · As a nonsmoker, you may find that your mood is better and you are less stressed. When and how will you feel healthier?   Quitting has real health benefits that start from day 1 of being smoke-free. And the longer you stay smoke-free, the healthier you get and the better you feel. The first hours  · After just 20 minutes, your blood pressure and heart rate go down. That means there's less stress on your heart and blood vessels. · Within 12 hours, the level of carbon monoxide in your blood drops back to normal. That makes room for more oxygen. With more oxygen in your body, you may notice that you have more energy than when you smoked. After 2 weeks  · Your lungs start to work better. · Your risk of heart attack starts to drop. After 1 month  · When your lungs are clear, you cough less and breathe deeper, so it's easier to be active. · Your sense of taste and smell return. That means you can enjoy food more than you have since you started smoking. Over the years  · After 1 year, your risk of heart disease is half what it would be if you kept smoking. · After 5 years, your risk of stroke starts to shrink. Within a few years after that, it's about the same as if you'd never smoked. · After 10 years, your risk of dying from lung cancer is cut by about half. And your risk for many other types of cancer is lower too. How would quitting help others in your life? When you quit smoking, you improve the health of everyone who now breathes in your smoke. · Their heart, lung, and cancer risks drop, much like yours. · They are sick less. For babies and small children, living smoke-free means they're less likely to have ear infections, pneumonia, and bronchitis. · If you're a woman who is or will be pregnant someday, quitting smoking means a healthier . · Children who are close to you are less likely to become adult smokers. Where can you learn more? Go to http://leslie-josue.info/. Enter [id number] [id number] [id number] in the search box to learn more about \"Learning About Benefits From Quitting Smoking. \"  Current as of: 2017  Content Version: 11.7  © 8669-0205 [employer]. Care instructions adapted under license by [employer] (which disclaims liability or warranty for this information). If you have questions about a medical condition or this instruction, always ask your healthcare professional. [address] any warranty or liability for your use of this information. We hope that we have addressed all of your medical concerns. The examination and treatment you received in the Emergency Department were for an emergent problem and were not intended as complete care. It is important that you follow up with your healthcare provider(s) for ongoing care. If your symptoms worsen or do not improve as expected, and you are unable to reach your usual health care provider(s), you should return to the Emergency Department. Today's healthcare is undergoing tremendous change, and patient satisfaction surveys are one of the many tools to assess the quality of medical care. You may receive a survey from the [employer] regarding your experience in the Emergency Department. I hope that your experience has been completely positive, particularly the medical care that I provided. As such, please participate in the survey; anything less than excellent does not meet my expectations or intentions. [facility] and [address] [place] [address] participate in nationally recognized quality of care measures. If your blood pressure is greater than 120/80, as reported below, we urge that you seek medical care to address the potential of high blood pressure, commonly known as hypertension. Hypertension can be hereditary or can be caused by certain medical conditions, pain, stress, or \"white coat syndrome. \"       Please make an appointment with your health care provider(s) for follow up of your Emergency Department visit.        VITALS:   Patient Vitals for the past 8 hrs:   Temp Pulse Resp BP SpO2   07/16/18 1357 98.5 °F (36.9 °C) 80 14 128/56 99 %          Thank you for allowing us to provide you with medical care today. We realize that you have many choices for your emergency care needs. Please choose us in the future for any continued health care needs. Ignacio Melton, [address] [place] [address].   Office: [phone]

## 2018-07-18 ENCOUNTER — HOSPITAL ENCOUNTER (EMERGENCY)
Age: 78
Discharge: HOME | End: 2018-07-18
Attending: EMERGENCY MEDICINE
Payer: MEDICARE

## 2018-07-18 VITALS
WEIGHT: 165 LBS | SYSTOLIC BLOOD PRESSURE: 119 MMHG | HEIGHT: 69 IN | DIASTOLIC BLOOD PRESSURE: 55 MMHG | HEART RATE: 76 BPM | RESPIRATION RATE: 16 BRPM | OXYGEN SATURATION: 98 % | BODY MASS INDEX: 24.44 KG/M2 | TEMPERATURE: 98.5 F

## 2018-07-18 DIAGNOSIS — Z51.89 VISIT FOR WOUND CHECK: Primary | ICD-10-CM

## 2018-07-18 PROCEDURE — 75810000275 HC EMERGENCY DEPT VISIT NO LEVEL OF CARE

## 2018-07-18 NOTE — ED PROVIDER NOTES
HPI Comments: 78 y.o. male with past medical history significant for CKD who presents from home via private vehicle 2 days s/p I&D for evaluation of wound. Pt was seen here 2 days ago and had an abscess drained on mid-upper back. Pt arrives today as instructed for a follow up and packing removal. Pt reports intermittent pain to area, but states it is much better since I&D. Pt reports he takes 1-2 BC powders 2-3x per week for frequent headaches. Pt denies any fever or chills. There are no other acute medical concerns at this time. Social hx: Current smoker (0.25 packs/day); No EtOH use; lives with daughter    Note written by Beuford Gowers, Scribe, as dictated by Odin Armstrong DNP 3:11 PM    The history is provided by the patient. No  was used. Past Medical History:   Diagnosis Date    CKD (chronic kidney disease) stage 3, GFR 30-59 ml/min 10/3/2014    Other ill-defined conditions(799.89)     recent cold, taking benadryl for sinus congestion    Tobacco abuse        Past Surgical History:   Procedure Laterality Date    ABDOMEN SURGERY PROC UNLISTED      HX HERNIA REPAIR      HX OTHER SURGICAL      colonoscopy         Family History:   Problem Relation Age of Onset    Other Other      patient does not know       Social History     Social History    Marital status: SINGLE     Spouse name: N/A    Number of children: N/A    Years of education: N/A     Occupational History    Not on file. Social History Main Topics    Smoking status: Current Every Day Smoker     Packs/day: 0.25    Smokeless tobacco: Never Used    Alcohol use No    Drug use: No    Sexual activity: Not on file     Other Topics Concern    Not on file     Social History Narrative         ALLERGIES: Review of patient's allergies indicates no known allergies. Review of Systems   Constitutional: Negative for activity change, appetite change, chills, fatigue and fever.    HENT: Negative for congestion, ear discharge, ear pain, sinus pain, sinus pressure, sore throat and trouble swallowing. Eyes: Negative for photophobia, pain, redness, itching and visual disturbance. Respiratory: Negative for chest tightness and shortness of breath. Cardiovascular: Negative for chest pain and palpitations. Gastrointestinal: Negative for abdominal distention, abdominal pain, nausea and vomiting. Endocrine: Negative. Genitourinary: Negative for difficulty urinating, frequency and urgency. Musculoskeletal: Negative for back pain, neck pain and neck stiffness. Skin: Positive for color change and wound. Negative for pallor and rash. Allergic/Immunologic: Negative. Neurological: Positive for headaches (chronic headaches). Negative for dizziness, syncope and weakness. Hematological: Does not bruise/bleed easily. Psychiatric/Behavioral: Negative for behavioral problems. The patient is not nervous/anxious. All other systems reviewed and are negative. Vitals:    07/18/18 1429   BP: 119/55   Pulse: 76   Resp: 16   Temp: 98.5 °F (36.9 °C)   SpO2: 98%   Weight: 74.8 kg (165 lb)   Height: 5' 9\" (1.753 m)            Physical Exam   Constitutional: He is oriented to person, place, and time. He appears well-developed and well-nourished. No distress. HENT:   Head: Normocephalic and atraumatic. Right Ear: External ear normal.   Left Ear: External ear normal.   Nose: Nose normal.   Mouth/Throat: Oropharynx is clear and moist.   Eyes: Conjunctivae and EOM are normal. Pupils are equal, round, and reactive to light. Right eye exhibits no discharge. Left eye exhibits no discharge. Neck: Normal range of motion. Neck supple. No JVD present. No tracheal deviation present. Cardiovascular: Normal rate, regular rhythm, normal heart sounds and intact distal pulses. Exam reveals no gallop. No murmur heard. Pulmonary/Chest: Effort normal and breath sounds normal. No respiratory distress. He has no wheezes.  He has no rales. He exhibits no tenderness. Abdominal: Soft. Bowel sounds are normal. He exhibits no distension. There is no tenderness. There is no rebound and no guarding. Genitourinary:   Genitourinary Comments: Negative     Musculoskeletal: Normal range of motion. He exhibits no edema or tenderness. Neurological: He is alert and oriented to person, place, and time. Skin: Skin is warm. No rash noted. No erythema. No pallor. Wound to left posterior back with interval improvement. Decreased redness. Packing removed and repacked. Patient to follow up in two- three days for wound check. Psychiatric: He has a normal mood and affect. His behavior is normal. Judgment and thought content normal.   Nursing note and vitals reviewed. MDM  Number of Diagnoses or Management Options  Visit for wound check: established and improving  Diagnosis management comments: Plan:  Discharge to home and follow up with PCP, follow up in ED for further wound check and possible repacking of wound. ED Course     1514:  Pt has been reexamined. Pt has no new complaints, changes or physical findings. Care plan outlined and precautions discussed. All available results were reviewed with pt. All medications were reviewed with pt. All of pt's questions and concerns were addressed. Pt agrees to F/U as instructed and agrees to return to ED upon further deterioration. Pt is ready to go home.   Marya Morales NP      Procedures

## 2018-07-21 ENCOUNTER — HOSPITAL ENCOUNTER (EMERGENCY)
Age: 78
Discharge: HOME | End: 2018-07-21
Attending: EMERGENCY MEDICINE
Payer: MEDICARE

## 2018-07-21 VITALS
WEIGHT: 170.64 LBS | HEART RATE: 67 BPM | OXYGEN SATURATION: 100 % | BODY MASS INDEX: 25.27 KG/M2 | TEMPERATURE: 98.5 F | RESPIRATION RATE: 19 BRPM | SYSTOLIC BLOOD PRESSURE: 135 MMHG | DIASTOLIC BLOOD PRESSURE: 65 MMHG | HEIGHT: 69 IN

## 2018-07-21 DIAGNOSIS — Z51.89 ENCOUNTER FOR WOUND RE-CHECK: Primary | ICD-10-CM

## 2018-07-21 PROCEDURE — 75810000275 HC EMERGENCY DEPT VISIT NO LEVEL OF CARE

## 2018-07-21 NOTE — ED PROVIDER NOTES
HPI Comments: 77 y/o male with PMHx of CKD, presenting with complaint of wound check. The patient was seen in the ED 5 days ago for an abscess on his upper back and had an I&D performed. He returned to the ED 3 days ago for a wound check and had the cavity re-packed. He states he has been doing well since then. He still has pain with certain arm movements but states that overall his pain is improving. The pain is currently 1/10, aching, non-radiating. He denies fevers, chills, redness or bleeding. The history is provided by the patient. Past Medical History:   Diagnosis Date    CKD (chronic kidney disease) stage 3, GFR 30-59 ml/min 10/3/2014    Other ill-defined conditions(799.89)     recent cold, taking benadryl for sinus congestion    Tobacco abuse        Past Surgical History:   Procedure Laterality Date    ABDOMEN SURGERY PROC UNLISTED      HX HERNIA REPAIR      HX OTHER SURGICAL      colonoscopy         Family History:   Problem Relation Age of Onset    Other Other      patient does not know       Social History     Social History    Marital status: SINGLE     Spouse name: N/A    Number of children: N/A    Years of education: N/A     Occupational History    Not on file. Social History Main Topics    Smoking status: Current Every Day Smoker     Packs/day: 0.25    Smokeless tobacco: Never Used    Alcohol use No    Drug use: No    Sexual activity: Not on file     Other Topics Concern    Not on file     Social History Narrative         ALLERGIES: Review of patient's allergies indicates no known allergies. Review of Systems   Constitutional: Negative for chills and fever. Respiratory: Negative for shortness of breath. Cardiovascular: Negative for chest pain. Gastrointestinal: Negative for diarrhea, nausea and vomiting. Musculoskeletal: Positive for back pain (upper back pain around I&D site). Negative for myalgias. Skin: Positive for wound (from I&D).    Neurological: Negative for syncope and light-headedness. All other systems reviewed and are negative. Vitals:    07/21/18 1749   BP: 135/65   Pulse: 67   Resp: 19   Temp: 98.5 °F (36.9 °C)   SpO2: 100%   Weight: 77.4 kg (170 lb 10.2 oz)   Height: 5' 9\" (1.753 m)            Physical Exam   Constitutional: He is oriented to person, place, and time. He appears well-developed and well-nourished. No distress. HENT:   Head: Normocephalic and atraumatic. Eyes: Conjunctivae and EOM are normal.   Neck: Normal range of motion. Neck supple. Cardiovascular: Normal rate. Pulmonary/Chest: Effort normal. No respiratory distress. Musculoskeletal: Normal range of motion. Neurological: He is alert and oriented to person, place, and time. Skin: Skin is warm and dry. He is not diaphoretic. Upper back with wound from I&D, mildly tender to palpation. Surrounding skin with hyperpigmentation but no erythema or increased warmth. Cavity approx 1 cm deep, no purulent material or drainage. Nursing note and vitals reviewed. MDM  Number of Diagnoses or Management Options  Encounter for wound re-check:      Amount and/or Complexity of Data Reviewed  Discuss the patient with other providers: yes (Dr. Sebastian Smith, ED attending)    Patient Progress  Patient progress: stable        ED Course       Procedures      77 y/o male with PMHx of CKD, presenting with complaint of wound check. The patient is well-appearing with normal vitals. Packing removed without difficulty, wound is healing well with no purulent material inside cavity. Due to depth of wound cavity will repack and have the patient follow up in 2 to 3 days for additional wound check. The patient verbalized understanding and agreement with this plan.

## 2018-07-26 ENCOUNTER — HOSPITAL ENCOUNTER (EMERGENCY)
Age: 78
Discharge: HOME | End: 2018-07-26
Attending: EMERGENCY MEDICINE
Payer: MEDICARE

## 2018-07-26 VITALS
RESPIRATION RATE: 18 BRPM | DIASTOLIC BLOOD PRESSURE: 55 MMHG | OXYGEN SATURATION: 97 % | HEIGHT: 70 IN | HEART RATE: 74 BPM | TEMPERATURE: 98.2 F | WEIGHT: 179 LBS | SYSTOLIC BLOOD PRESSURE: 118 MMHG | BODY MASS INDEX: 25.62 KG/M2

## 2018-07-26 DIAGNOSIS — Z51.89 ENCOUNTER FOR WOUND RE-CHECK: Primary | ICD-10-CM

## 2018-07-26 PROCEDURE — 75810000275 HC EMERGENCY DEPT VISIT NO LEVEL OF CARE

## 2018-07-26 PROCEDURE — 77030011256 HC DRSG MEPILEX <16IN NO BORD MOLN -A

## 2018-07-26 NOTE — ED PROVIDER NOTES
HPI Comments: Angie Moreland is a 78 y.o. male with PMH of CKD, tobacco use presents to emergency room ambulatory for wound check. This is his 4th ER visit for wound checks, first diagnosed with an infected sebaceous cyst of the upper back on 7/16, I&D'd and given Rx doxy which he has 1 pill left, and then seen on 7/18 and 7/21 with wound requiring packing on those visits but was otherwise healing well. He has no new complaints, just not able to see the wound and has no help at home to care of it. He denies F/C, N/V/D, CP, SOB. PCP: None Surgical hx- colonoscopy, hernia repair Social hx- + tobacco, no ETOH The patient has no other complaints at this time. Patient is a 78 y.o. male presenting with wound check. The history is provided by the patient and medical records. Wound Check Pertinent negatives include no numbness, no back pain and no neck pain. Past Medical History:  
Diagnosis Date  CKD (chronic kidney disease) stage 3, GFR 30-59 ml/min 10/3/2014  Other ill-defined conditions(799.89)   
 recent cold, taking benadryl for sinus congestion  Tobacco abuse Past Surgical History:  
Procedure Laterality Date [address] UNLISTED  HX HERNIA REPAIR    
 HX OTHER SURGICAL    
 colonoscopy Family History:  
Problem Relation Age of Onset  Other Other   
  patient does not know Social History Social History  Marital status: SINGLE Spouse name: N/A  
 Number of children: N/A  
 Years of education: N/A Occupational History  Not on file. Social History Main Topics  Smoking status: Current Every Day Smoker Packs/day: 0.25  Smokeless tobacco: Never Used  Alcohol use No  
 Drug use: No  
 Sexual activity: Not on file Other Topics Concern  Not on file Social History Narrative ALLERGIES: Review of patient's allergies indicates no known allergies. Review of Systems Constitutional: Negative. Negative for activity change, chills, fatigue and unexpected weight change. Respiratory: Negative for cough, chest tightness, shortness of breath and wheezing. Cardiovascular: Negative. Negative for chest pain and palpitations. Gastrointestinal: Negative. Negative for abdominal pain, diarrhea, nausea and vomiting. Genitourinary: Negative. Negative for dysuria, flank pain, frequency and hematuria. Musculoskeletal: Negative. Negative for arthralgias, back pain, neck pain and neck stiffness. Skin: Positive for wound. Negative for color change and rash. Neurological: Negative. Negative for dizziness, numbness and headaches. Psychiatric/Behavioral: Negative. Negative for confusion. All other systems reviewed and are negative. Vitals:  
 07/26/18 1625 BP: 118/55 Pulse: 74 Resp: 18 Temp: 98.2 °F (36.8 °C) SpO2: 97% Weight: 81.2 kg (179 lb) Height: 5' 9.5\" (1.765 m) Physical Exam  
Constitutional: He is oriented to person, place, and time. He appears well-developed and well-nourished. He is active. Non-toxic appearance. No distress. HENT:  
Head: Normocephalic and atraumatic. Eyes: Conjunctivae are normal. Pupils are equal, round, and reactive to light. Right eye exhibits no discharge. Left eye exhibits no discharge. Neck: Normal range of motion and full passive range of motion without pain. Neck supple. No tracheal tenderness present. Cardiovascular: Normal rate, intact distal pulses and normal pulses. Exam reveals friction rub. Pulmonary/Chest: Effort normal and breath sounds normal. No respiratory distress. He has no wheezes. He has no rales. He exhibits no tenderness. Abdominal: Soft. Bowel sounds are normal. He exhibits no distension. Musculoskeletal: Normal range of motion. He exhibits no edema or tenderness. Back: 
 
Neurological: He is alert and oriented to person, place, and time. He has normal strength.  No cranial nerve deficit or sensory deficit. Coordination normal.  
Skin: Skin is warm, dry and intact. No abrasion and no rash noted. He is not diaphoretic. No erythema. Psychiatric: He has a normal mood and affect. His speech is normal and behavior is normal. Cognition and memory are normal.  
Nursing note and vitals reviewed. MDM Number of Diagnoses or Management Options Encounter for wound re-check:  
Diagnosis management comments:  
Ddx: abscess, cellulitis Amount and/or Complexity of Data Reviewed Review and summarize past medical records: yes Discuss the patient with other providers: yes Patient Progress Patient progress: stable ED Course Procedures I discussed patient's PMH, exam findings as well as careplan with the ER attending who agrees with care plan. Josy Lowe PA-C Wound well-healing, advised patient to continue the abx until gone, wound care discussed and discouraged going into any pool or river water while the wound is open and patient understands and agrees. Josy Lowe PA-C 
 
 
DISCHARGE NOTE: 
4:50 PM 
The patient's results have been reviewed with them and/or available family. Patient and/or family verbally conveyed their understanding and agreement of the patient's signs, symptoms, diagnosis, treatment and prognosis and additionally agree to follow up as recommended in the discharge instructions or to return to the Emergency Room should their condition change prior to their follow-up appointment. The patient/family verbally agrees with the care-plan and verbally conveys that all of their questions have been answered. The discharge instructions have also been provided to the patient and/or family with some educational information regarding the patient's diagnosis as well a list of reasons why the patient would want to return to the ER prior to their follow-up appointment, should their condition change. Plan: 
1. F/U with PCP as needed 2.  Complete doxycycline as directed 3. Wound care discussed Return precautions discussed and advised to return to ER if worse

## 2018-07-26 NOTE — ED TRIAGE NOTES
\"I need a wound check, I have an abscess on my back and they packed it. I need the packing out. This is my third or fourth visit. \"

## 2018-07-26 NOTE — DISCHARGE INSTRUCTIONS

## 2018-08-02 ENCOUNTER — OFFICE VISIT (OUTPATIENT)
Dept: FAMILY MEDICINE CLINIC | Age: 78
End: 2018-08-02

## 2018-08-02 VITALS
RESPIRATION RATE: 18 BRPM | OXYGEN SATURATION: 94 % | WEIGHT: 170 LBS | BODY MASS INDEX: 24.34 KG/M2 | SYSTOLIC BLOOD PRESSURE: 103 MMHG | HEART RATE: 74 BPM | TEMPERATURE: 98.3 F | HEIGHT: 70 IN | DIASTOLIC BLOOD PRESSURE: 62 MMHG

## 2018-08-02 DIAGNOSIS — L02.212 BACK ABSCESS: Primary | ICD-10-CM

## 2018-08-02 NOTE — PROGRESS NOTES
Chief Complaint Patient presents with  ED Follow-up I &D abssess on back 1. Have you been to the ER, urgent care clinic since your last visit? Hospitalized since your last visit? No 
 
2. Have you seen or consulted any other health care providers outside of the [address] [place] [address] since your last visit? Include any pap smears or colon screening.  No

## 2018-08-02 NOTE — PROGRESS NOTES
Hermes Elliott is a 70 y.o. male who presents for follow up for abscess on his back. Pt went to ED on 7/16 and was given doxycycline for his abscess which he states didn't help. I&D was done in ED on 7/16. Pt has mela following up at ED for wound checks on 7/18, 7/21, and 7/26. ED note from 7/26 says the wound is healing well. Pt reports mild pain with movement. No pain if sitting still. Pt did about 6hrs of driving on Monday 6/98 and had a lot of pain after getting out of the car. Thinks he may have been leaning back on the abscess in the car. No fever or chills. Pt denies fever, nausea, or vomiting. He has been changing his dressing about every 2 days. Minimal drainage. PMHx: 
Past Medical History:  
Diagnosis Date  CKD (chronic kidney disease) stage 3, GFR 30-59 ml/min 10/3/2014  Other ill-defined conditions(799.89)   
 recent cold, taking benadryl for sinus congestion  Tobacco abuse Meds:  
Current Outpatient Prescriptions Medication Sig Dispense Refill  traMADol (ULTRAM) 50 mg tablet Take 1 Tab by mouth every six (6) hours as needed for Pain. Max Daily Amount: 200 mg. Indications: Pain 10 Tab 0  Aspirin-Caffeine (BC) 845-65 mg pwpk Take  by mouth. Allergies:  
No Known Allergies Smoker: 
History Smoking Status  Current Every Day Smoker  Packs/day: 0.25 Smokeless Tobacco  
 Never Used ETOH:  
History Alcohol Use No  
 
 
FH:  
Family History Problem Relation Age of Onset  Other Other   
  patient does not know ROS: 
Per HPI Physical Exam: 
Visit Vitals  /62  Pulse 74  Temp 98.3 °F (36.8 °C) (Oral)  Resp 18  Ht 5' 9.5\" (1.765 m)  Wt 170 lb (77.1 kg)  SpO2 94%  BMI 24.74 kg/m2 GEN: No apparent distress. Alert and oriented and responds to all questions appropriately. EYES:  Conjunctiva clear; LUNGS: Respirations unlabored;  
EXT: Well perfused. No edema.  
SKIN: I&D site on the upper mid back appears to be healing well. Minimal drainage when expressed. No surrounding erythema or edema. Wound was flushed with NS and sterile bandage applied. Assessment: 
Well healing abscess of the upper back Plan: 
Discussed wound care. Recommended daily dressing change. RTC: 1 week and PRN.

## 2018-08-10 ENCOUNTER — OFFICE VISIT (OUTPATIENT)
Dept: FAMILY MEDICINE CLINIC | Age: 78
End: 2018-08-10

## 2018-08-10 VITALS
OXYGEN SATURATION: 95 % | SYSTOLIC BLOOD PRESSURE: 92 MMHG | DIASTOLIC BLOOD PRESSURE: 50 MMHG | WEIGHT: 165 LBS | HEART RATE: 67 BPM | BODY MASS INDEX: 23.62 KG/M2 | HEIGHT: 70 IN | RESPIRATION RATE: 17 BRPM | TEMPERATURE: 98.4 F

## 2018-08-10 DIAGNOSIS — L02.212 BACK ABSCESS: Primary | ICD-10-CM

## 2018-08-10 NOTE — PROGRESS NOTES
I reviewed with the resident the medical history and the resident's findings on the physical examination. I discussed with the resident the patient's diagnosis and concur with the plan.     Well healing wound  Continue dressing changes at home

## 2018-08-10 NOTE — PROGRESS NOTES
Chief Complaint   Patient presents with    Wound Check     bandage changed last night     1. Have you been to the ER, urgent care clinic since your last visit? Hospitalized since your last visit? No    2. Have you seen or consulted any other health care providers outside of the [address] [place] [address] since your last visit? Include any pap smears or colon screening.  No

## 2018-08-10 NOTE — MR AVS SNAPSHOT
[address] 
[phone] Patient: Florence Sagastume MRN: [id number] TWK:[id number] Visit Information Date & Time Provider Department Dept. Phone Encounter #  
 8/10/2018 10:30 AM Ridge Rey MD [facility] [phone] [id number] Follow-up Instructions Return if symptoms worsen or fail to improve. Upcoming Health Maintenance Date Due ZOSTER VACCINE AGE 60> 5/30/2002 GLAUCOMA SCREENING Q2Y 7/30/2007 MEDICARE YEARLY EXAM 3/14/2018 Influenza Age 5 to Adult 8/1/2018 DTaP/Tdap/Td series (2 - Td) 7/16/2028 Allergies as of 8/10/2018  Review Complete On: 8/10/2018 By: Laci Leo LPN No Known Allergies Current Immunizations  Reviewed on 2/8/2012 Name Date Pneumococcal Polysaccharide (PPSV-23) 10/6/2014  6:19 PM  
 Tdap 7/16/2018  2:52 PM  
 ZZZ-RETIRED (DO NOT USE) Pneumococcal Vaccine (Unspecified Type) 10/8/2011 Not reviewed this visit You Were Diagnosed With   
  
 Codes Comments Back abscess    -  Primary ICD-10-CM: J85.766 ICD-9-CM: 808. 2 Vitals BP Pulse Temp Resp Height(growth percentile) Weight(growth percentile) 92/50 67 98.4 °F (36.9 °C) (Oral) 17 5' 9.5\" (1.765 m) 165 lb (74.8 kg) SpO2 BMI Smoking Status 95% 24.02 kg/m2 Current Every Day Smoker Vitals History BMI and BSA Data Body Mass Index Body Surface Area 24.02 kg/m 2 1.92 m 2 Preferred Pharmacy Pharmacy Name Phone [facility] [place], [address] [place] [facility] [phone] Your Updated Medication List  
  
   
This list is accurate as of 8/10/18 10:30 AM.  Always use your most recent med list.  
  
  
  
  
 -65 mg Pwpk Generic drug:  Aspirin-Caffeine Take  by mouth. traMADol 50 mg tablet Commonly known as:  Estiven Schultz  
 Take 1 Tab by mouth every six (6) hours as needed for Pain. Max Daily Amount: 200 mg. Indications: Pain Follow-up Instructions Return if symptoms worsen or fail to improve. Introducing [facility]! [facility] introduces [employer] patient portal. Now you can access parts of your medical record, email your doctor's office, and request medication refills online. 1. In your internet browser, go to [email or web address] 2. Click on the First Time User? Click Here link in the Sign In box. You will see the New Member Sign Up page. 3. Enter your [employer] Access Code exactly as it appears below. You will not need to use this code after youve completed the sign-up process. If you do not sign up before the expiration date, you must request a new code. · [employer] Access Code: [email or web address] Expires: 10/14/2018  1:58 PM 
 
4. Enter the last four digits of your Social Security Number (xxxx) and Date of Birth (mm/dd/yyyy) as indicated and click Submit. You will be taken to the next sign-up page. 5. Create a [employer] ID. This will be your [employer] login ID and cannot be changed, so think of one that is secure and easy to remember. 6. Create a [employer] password. You can change your password at any time. 7. Enter your Password Reset Question and Answer. This can be used at a later time if you forget your password. 8. Enter your e-mail address. You will receive e-mail notification when new information is available in [address] 9. Click Sign Up. You can now view and download portions of your medical record. 10. Click the Download Summary menu link to download a portable copy of your medical information. If you have questions, please visit the Frequently Asked Questions section of the [employer] website. Remember, [employer] is NOT to be used for urgent needs. For medical emergencies, dial 911. Now available from your iPhone and Android! Please provide this summary of care documentation to your next provider. Your primary care clinician is listed as NONE. If you have any questions after today's visit, please call [phone].

## 2018-08-10 NOTE — PROGRESS NOTES
CC: wound check       HPI:    Patient states that he has no pain at the site of his back abscess anymore. He also states that his wound still drains daily but it only fills about the size of a q-tip. This is overall improved since his last visit one week ago. He is s/p 10 day treatment with doxycycline. Review of Systems:    Constitutional: negative for fever, chills, or night sweats   MSK: negative for back pain       Current Outpatient Prescriptions:     Aspirin-Caffeine (BC) 845-65 mg pwpk, Take  by mouth., Disp: , Rfl:     traMADol (ULTRAM) 50 mg tablet, Take 1 Tab by mouth every six (6) hours as needed for Pain. Max Daily Amount: 200 mg. Indications: Pain, Disp: 10 Tab, Rfl: 0    No Known Allergies      Past Medical History:   Diagnosis Date    CKD (chronic kidney disease) stage 3, GFR 30-59 ml/min 10/3/2014    Other ill-defined conditions(799.89)     recent cold, taking benadryl for sinus congestion    Tobacco abuse           Social History     Social History    Marital status: SINGLE     Spouse name: N/A    Number of children: N/A    Years of education: N/A     Occupational History    Not on file. Social History Main Topics    Smoking status: Current Every Day Smoker     Packs/day: 0.25    Smokeless tobacco: Never Used    Alcohol use No    Drug use: No    Sexual activity: Not on file     Other Topics Concern    Not on file     Social History Narrative          Family History   Problem Relation Age of Onset    Other Other      patient does not know         Physical Exam:     Visit Vitals    BP 92/50    Pulse 67    Temp 98.4 °F (36.9 °C) (Oral)    Resp 17    Ht 5' 9.5\" (1.765 m)    Wt 165 lb (74.8 kg)    SpO2 95%    BMI 24.02 kg/m2       General appearance: alert, oriented, NAD   Back: 2cm open wound along upper back in between shoulder blades with minimal drainage the size of a tear drop   Skin: no visible rashes      Assessment/Plan:     1.  Back abscess: stable wound with minimal drainage.  Patient afebrile with no systemic signs of infection.   - Patient instructed to continue wound care daily       RTC as needed       Patient discussed with Dr. Angelica Williamson MD  Family Medicine Resident

## 2019-01-06 ENCOUNTER — HOSPITAL ENCOUNTER (EMERGENCY)
Age: 79
Discharge: HOME | End: 2019-01-06
Attending: EMERGENCY MEDICINE | Admitting: EMERGENCY MEDICINE
Payer: MEDICARE

## 2019-01-06 ENCOUNTER — APPOINTMENT (OUTPATIENT)
Dept: CT IMAGING | Age: 79
End: 2019-01-06
Attending: EMERGENCY MEDICINE
Payer: MEDICARE

## 2019-01-06 VITALS
SYSTOLIC BLOOD PRESSURE: 124 MMHG | OXYGEN SATURATION: 96 % | TEMPERATURE: 98 F | BODY MASS INDEX: 23.62 KG/M2 | RESPIRATION RATE: 18 BRPM | DIASTOLIC BLOOD PRESSURE: 59 MMHG | HEART RATE: 74 BPM | WEIGHT: 165 LBS | HEIGHT: 70 IN

## 2019-01-06 DIAGNOSIS — S33.5XXA LUMBAR BACK SPRAIN, INITIAL ENCOUNTER: Primary | ICD-10-CM

## 2019-01-06 LAB
ALBUMIN SERPL-MCNC: 3.6 G/DL (ref 3.5–5)
ALBUMIN/GLOB SERPL: 0.9 {RATIO} (ref 1.1–2.2)
ALP SERPL-CCNC: 114 U/L (ref 45–117)
ALT SERPL-CCNC: 16 U/L (ref 12–78)
ANION GAP SERPL CALC-SCNC: 7 MMOL/L (ref 5–15)
APPEARANCE UR: CLEAR
AST SERPL-CCNC: 22 U/L (ref 15–37)
BACTERIA URNS QL MICRO: NEGATIVE /HPF
BASOPHILS # BLD: 0 K/UL (ref 0–0.1)
BASOPHILS NFR BLD: 0 % (ref 0–1)
BILIRUB SERPL-MCNC: 0.4 MG/DL (ref 0.2–1)
BILIRUB UR QL: NEGATIVE
BUN SERPL-MCNC: 16 MG/DL (ref 6–20)
BUN/CREAT SERPL: 12 (ref 12–20)
CALCIUM SERPL-MCNC: 9 MG/DL (ref 8.5–10.1)
CHLORIDE SERPL-SCNC: 108 MMOL/L (ref 97–108)
CO2 SERPL-SCNC: 28 MMOL/L (ref 21–32)
COLOR UR: NORMAL
CREAT SERPL-MCNC: 1.36 MG/DL (ref 0.7–1.3)
DIFFERENTIAL METHOD BLD: NORMAL
EOSINOPHIL # BLD: 0.3 K/UL (ref 0–0.4)
EOSINOPHIL NFR BLD: 4 % (ref 0–7)
EPITH CASTS URNS QL MICRO: NORMAL /LPF
ERYTHROCYTE [DISTWIDTH] IN BLOOD BY AUTOMATED COUNT: 13.4 % (ref 11.5–14.5)
GLOBULIN SER CALC-MCNC: 3.9 G/DL (ref 2–4)
GLUCOSE SERPL-MCNC: 72 MG/DL (ref 65–100)
GLUCOSE UR STRIP.AUTO-MCNC: NEGATIVE MG/DL
HCT VFR BLD AUTO: 42.6 % (ref 36.6–50.3)
HGB BLD-MCNC: 14.5 G/DL (ref 12.1–17)
HGB UR QL STRIP: NEGATIVE
IMM GRANULOCYTES # BLD: 0 K/UL (ref 0–0.04)
IMM GRANULOCYTES NFR BLD AUTO: 0 % (ref 0–0.5)
KETONES UR QL STRIP.AUTO: NEGATIVE MG/DL
LEUKOCYTE ESTERASE UR QL STRIP.AUTO: NEGATIVE
LYMPHOCYTES # BLD: 2.8 K/UL (ref 0.8–3.5)
LYMPHOCYTES NFR BLD: 29 % (ref 12–49)
MCH RBC QN AUTO: 33.6 PG (ref 26–34)
MCHC RBC AUTO-ENTMCNC: 34 G/DL (ref 30–36.5)
MCV RBC AUTO: 98.8 FL (ref 80–99)
MONOCYTES # BLD: 0.8 K/UL (ref 0–1)
MONOCYTES NFR BLD: 8 % (ref 5–13)
NEUTS SEG # BLD: 5.6 K/UL (ref 1.8–8)
NEUTS SEG NFR BLD: 59 % (ref 32–75)
NITRITE UR QL STRIP.AUTO: NEGATIVE
NRBC # BLD: 0 K/UL (ref 0–0.01)
NRBC BLD-RTO: 0 PER 100 WBC
PH UR STRIP: 5.5 [PH] (ref 5–8)
PLATELET # BLD AUTO: 196 K/UL (ref 150–400)
PMV BLD AUTO: 11.2 FL (ref 8.9–12.9)
POTASSIUM SERPL-SCNC: 4.3 MMOL/L (ref 3.5–5.1)
PROT SERPL-MCNC: 7.5 G/DL (ref 6.4–8.2)
PROT UR STRIP-MCNC: NEGATIVE MG/DL
RBC # BLD AUTO: 4.31 M/UL (ref 4.1–5.7)
RBC #/AREA URNS HPF: NORMAL /HPF (ref 0–5)
SODIUM SERPL-SCNC: 143 MMOL/L (ref 136–145)
SP GR UR REFRACTOMETRY: 1.01 (ref 1–1.03)
UA: UC IF INDICATED,UAUC: NORMAL
UROBILINOGEN UR QL STRIP.AUTO: 0.2 EU/DL (ref 0.2–1)
WBC # BLD AUTO: 9.6 K/UL (ref 4.1–11.1)
WBC URNS QL MICRO: NORMAL /HPF (ref 0–4)

## 2019-01-06 PROCEDURE — 85025 COMPLETE CBC W/AUTO DIFF WBC: CPT

## 2019-01-06 PROCEDURE — 80053 COMPREHEN METABOLIC PANEL: CPT

## 2019-01-06 PROCEDURE — 74011636637 HC RX REV CODE- 636/637: Performed by: EMERGENCY MEDICINE

## 2019-01-06 PROCEDURE — 81001 URINALYSIS AUTO W/SCOPE: CPT

## 2019-01-06 PROCEDURE — 99283 EMERGENCY DEPT VISIT LOW MDM: CPT

## 2019-01-06 PROCEDURE — 74176 CT ABD & PELVIS W/O CONTRAST: CPT

## 2019-01-06 PROCEDURE — 74011250637 HC RX REV CODE- 250/637: Performed by: EMERGENCY MEDICINE

## 2019-01-06 PROCEDURE — 36415 COLL VENOUS BLD VENIPUNCTURE: CPT

## 2019-01-06 PROCEDURE — A9270 NON-COVERED ITEM OR SERVICE: HCPCS | Performed by: EMERGENCY MEDICINE

## 2019-01-06 RX ORDER — TRAMADOL HYDROCHLORIDE 50 MG/1
50 TABLET ORAL
Qty: 20 TAB | Refills: 0 | Status: SHIPPED | OUTPATIENT
Start: 2019-01-06

## 2019-01-06 RX ORDER — PREDNISONE 50 MG/1
50 TABLET ORAL DAILY
Qty: 3 TAB | Refills: 0 | Status: SHIPPED | OUTPATIENT
Start: 2019-01-07 | End: 2019-01-10

## 2019-01-06 RX ORDER — PREDNISONE 20 MG/1
60 TABLET ORAL
Status: COMPLETED | OUTPATIENT
Start: 2019-01-06 | End: 2019-01-06

## 2019-01-06 RX ORDER — TRAMADOL HYDROCHLORIDE 50 MG/1
50 TABLET ORAL
Status: COMPLETED | OUTPATIENT
Start: 2019-01-06 | End: 2019-01-06

## 2019-01-06 RX ADMIN — TRAMADOL HYDROCHLORIDE 50 MG: 50 TABLET, FILM COATED ORAL at 19:35

## 2019-01-06 RX ADMIN — PREDNISONE 60 MG: 20 TABLET ORAL at 19:35
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
## 2019-10-14 ENCOUNTER — HOSPITAL ENCOUNTER (OUTPATIENT)
Dept: GENERAL RADIOLOGY | Age: 79
Discharge: HOME | End: 2019-10-14
Attending: FAMILY MEDICINE
Payer: MEDICARE

## 2019-10-14 DIAGNOSIS — R05.9 COUGH: ICD-10-CM

## 2019-10-14 PROCEDURE — 71046 X-RAY EXAM CHEST 2 VIEWS: CPT

## 2021-11-06 NOTE — PERIOP NOTES
Patient name stated on voicemail; Left generalized message regarding COVID requirements, a COVID test needs to be completed in order to proceed with procedures at [address] [place] [address]. Left message regarding curbside COVID swabbing at [address] [place] [address] Friday, November 12th from [id number]. Call [address] [place] [address] Endoscopy at [phone] Monday thru Friday with questions or concerns.

## 2021-11-09 NOTE — PERIOP NOTES
Patient verbalized unsure if he is going to proceed with procedure. Informed patient of COVID requirements, patient to complete COVID curbside testing at [facility] Friday, November 12th between [id number]. Patient verbalized understanding that COVID test is required to proceed with procedure.

## 2021-11-12 ENCOUNTER — HOSPITAL ENCOUNTER (OUTPATIENT)
Dept: LAB | Age: 81
Discharge: HOME | End: 2021-11-12
Payer: MEDICARE

## 2021-11-12 ENCOUNTER — TRANSCRIBE ORDER (OUTPATIENT)
Dept: EMERGENCY DEPT | Age: 81
End: 2021-11-12

## 2021-11-12 DIAGNOSIS — Z01.812 PRE-OPERATIVE LABORATORY EXAMINATION: ICD-10-CM

## 2021-11-12 DIAGNOSIS — Z01.812 PRE-OPERATIVE LABORATORY EXAMINATION: Primary | ICD-10-CM

## 2021-11-12 PROCEDURE — U0003 INFECTIOUS AGENT DETECTION BY NUCLEIC ACID (DNA OR RNA); SEVERE ACUTE RESPIRATORY SYNDROME CORONAVIRUS 2 (SARS-COV-2) (CORONAVIRUS DISEASE [COVID-19]), AMPLIFIED PROBE TECHNIQUE, MAKING USE OF HIGH THROUGHPUT TECHNOLOGIES AS DESCRIBED BY CMS-2020-01-R: HCPCS

## 2021-11-13 LAB
SARS-COV-2, XPLCVT: NOT DETECTED
SOURCE, COVRS: NORMAL

## 2021-11-16 ENCOUNTER — HOSPITAL ENCOUNTER (OUTPATIENT)
Age: 81
Setting detail: OUTPATIENT SURGERY
Discharge: HOME | End: 2021-11-16
Attending: SPECIALIST | Admitting: SPECIALIST
Payer: MEDICARE

## 2021-11-16 ENCOUNTER — ANESTHESIA (OUTPATIENT)
Dept: ENDOSCOPY | Age: 81
End: 2021-11-16
Payer: MEDICARE

## 2021-11-16 ENCOUNTER — ANESTHESIA EVENT (OUTPATIENT)
Dept: ENDOSCOPY | Age: 81
End: 2021-11-16
Payer: MEDICARE

## 2021-11-16 VITALS
SYSTOLIC BLOOD PRESSURE: 128 MMHG | OXYGEN SATURATION: 98 % | HEART RATE: 52 BPM | WEIGHT: 160.94 LBS | TEMPERATURE: 97.8 F | RESPIRATION RATE: 15 BRPM | DIASTOLIC BLOOD PRESSURE: 61 MMHG | HEIGHT: 70 IN | BODY MASS INDEX: 23.04 KG/M2

## 2021-11-16 PROCEDURE — 88305 TISSUE EXAM BY PATHOLOGIST: CPT

## 2021-11-16 PROCEDURE — 74011250636 HC RX REV CODE- 250/636: Performed by: NURSE ANESTHETIST, CERTIFIED REGISTERED

## 2021-11-16 PROCEDURE — 76060000031 HC ANESTHESIA FIRST 0.5 HR: Performed by: SPECIALIST

## 2021-11-16 PROCEDURE — 2709999900 HC NON-CHARGEABLE SUPPLY: Performed by: SPECIALIST

## 2021-11-16 PROCEDURE — 77030013992 HC SNR POLYP ENDOSC BSC -B: Performed by: SPECIALIST

## 2021-11-16 PROCEDURE — 76040000019: Performed by: SPECIALIST

## 2021-11-16 RX ORDER — AMOXICILLIN 500 MG/1
TABLET, FILM COATED ORAL
COMMUNITY

## 2021-11-16 RX ORDER — MIDAZOLAM HYDROCHLORIDE 1 MG/ML
.25-5 INJECTION, SOLUTION INTRAMUSCULAR; INTRAVENOUS AS NEEDED
Status: DISCONTINUED | OUTPATIENT
Start: 2021-11-16 | End: 2021-11-16 | Stop reason: HOSPADM

## 2021-11-16 RX ORDER — FLUMAZENIL 0.1 MG/ML
0.2 INJECTION INTRAVENOUS
Status: DISCONTINUED | OUTPATIENT
Start: 2021-11-16 | End: 2021-11-16 | Stop reason: HOSPADM

## 2021-11-16 RX ORDER — DEXTROMETHORPHAN/PSEUDOEPHED 2.5-7.5/.8
1.2 DROPS ORAL
Status: DISCONTINUED | OUTPATIENT
Start: 2021-11-16 | End: 2021-11-16 | Stop reason: HOSPADM

## 2021-11-16 RX ORDER — SODIUM CHLORIDE 9 MG/ML
50 INJECTION, SOLUTION INTRAVENOUS CONTINUOUS
Status: DISCONTINUED | OUTPATIENT
Start: 2021-11-16 | End: 2021-11-16 | Stop reason: HOSPADM

## 2021-11-16 RX ORDER — FENTANYL CITRATE 50 UG/ML
25 INJECTION, SOLUTION INTRAMUSCULAR; INTRAVENOUS AS NEEDED
Status: DISCONTINUED | OUTPATIENT
Start: 2021-11-16 | End: 2021-11-16 | Stop reason: HOSPADM

## 2021-11-16 RX ORDER — PROPOFOL 10 MG/ML
INJECTION, EMULSION INTRAVENOUS AS NEEDED
Status: DISCONTINUED | OUTPATIENT
Start: 2021-11-16 | End: 2021-11-16 | Stop reason: HOSPADM

## 2021-11-16 RX ORDER — SODIUM CHLORIDE 9 MG/ML
INJECTION, SOLUTION INTRAVENOUS
Status: DISCONTINUED | OUTPATIENT
Start: 2021-11-16 | End: 2021-11-16 | Stop reason: HOSPADM

## 2021-11-16 RX ORDER — NALOXONE HYDROCHLORIDE 0.4 MG/ML
0.4 INJECTION, SOLUTION INTRAMUSCULAR; INTRAVENOUS; SUBCUTANEOUS
Status: DISCONTINUED | OUTPATIENT
Start: 2021-11-16 | End: 2021-11-16 | Stop reason: HOSPADM

## 2021-11-16 RX ADMIN — PROPOFOL INJECTABLE EMULSION 10 MG: 10 INJECTION, EMULSION INTRAVENOUS at 11:08

## 2021-11-16 RX ADMIN — PROPOFOL INJECTABLE EMULSION 20 MG: 10 INJECTION, EMULSION INTRAVENOUS at 11:11

## 2021-11-16 RX ADMIN — PROPOFOL INJECTABLE EMULSION 20 MG: 10 INJECTION, EMULSION INTRAVENOUS at 11:14

## 2021-11-16 RX ADMIN — PROPOFOL INJECTABLE EMULSION 10 MG: 10 INJECTION, EMULSION INTRAVENOUS at 11:18

## 2021-11-16 RX ADMIN — PROPOFOL INJECTABLE EMULSION 70 MG: 10 INJECTION, EMULSION INTRAVENOUS at 11:05

## 2021-11-16 RX ADMIN — SODIUM CHLORIDE: 900 INJECTION, SOLUTION INTRAVENOUS at 11:02

## 2021-11-16 NOTE — PERIOP NOTES
Stanamalia Lóepz  1940  [id number]    Situation:  Verbal report received from: Minh Figueroa  Procedure: Procedure(s):  COLONOSCOPY  ENDOSCOPIC POLYPECTOMY    Background:    Preoperative diagnosis: Personal history of colonic polyps [Z86.010]  Postoperative diagnosis: ascending colon polyps  transverse colon polyp  rectum polyp    :  Dr. Jeff Sykes  Assistant(s): Endoscopy Technician-1: Promise León  Endoscopy RN-1: Alexa Nagel RN    Specimens:   ID Type Source Tests Collected by Time Destination   1 : ascending colon polyps Preservative Colon, Ascending  Tari Borjas MD 11/16/2021 1113 Pathology   2 : transverse colon polyps Preservative Colon, Transverse  Tari Borjas MD 11/16/2021 1122 Pathology   3 : rectum polyp Preservative Rectum  Tari Borjas MD 11/16/2021 1122 Pathology     H. Pylori  n/a    Assessment:  Intra-procedure medications     Anesthesia gave intra-procedure sedation and medications, see anesthesia flow sheet yes    Intravenous fluids: NS@ KVO     Vital signs stable yes    Abdominal assessment: round and soft yes    Recommendation:  Discharge patient per MD order yes.   Family or Friend yes  Permission to share finding with family or friend yes

## 2021-11-16 NOTE — INTERVAL H&P NOTE
Pre-Endoscopy H&P Update  Chief complaint/HPI/ROS:  The indication for the procedure, the patient's history and the patient's current medications are reviewed prior to the procedure and that data is reported on the H&P to which this document is attached. Any significant complaints with regard to organ systems will be noted, and if not mentioned then a review of systems is not contributory. Past Medical History:   Diagnosis Date    CKD (chronic kidney disease) stage 3, GFR 30-59 ml/min ([facility]) 10/03/2014    Other ill-defined conditions(799.89)     recent cold, taking benadryl for sinus congestion    Tobacco abuse       Past Surgical History:   Procedure Laterality Date    HX HERNIA REPAIR      HX OTHER SURGICAL      colonoscopy    CO ABDOMEN SURGERY PROC UNLISTED       Social   Social History     Tobacco Use    Smoking status: Current Every Day Smoker     Packs/day: 0.25    Smokeless tobacco: Never Used   Substance Use Topics    Alcohol use: No      Family History   Problem Relation Age of Onset    Other Other         patient does not know      No Known Allergies   Prior to Admission Medications   Prescriptions Last Dose Informant Patient Reported? Taking? Aspirin-Caffeine (BC) 845-65 mg pwpk 11/15/2021 at Unknown time  Yes Yes   Sig: Take  by mouth. amoxicillin 500 mg tab 11/15/2021 at Unknown time  Yes Yes   Sig: Take  by mouth. traMADol (ULTRAM) 50 mg tablet Not Taking at Unknown time  No No   Sig: Take 1 Tab by mouth every six (6) hours as needed for Pain. Max Daily Amount: 200 mg. Patient not taking: Reported on 11/16/2021      Facility-Administered Medications: None       PHYSICAL EXAM:  The patient is examined immediately prior to the procedure. Visit Vitals  BP (!) 125/57   Pulse (!) 53   Temp 98.7 °F (37.1 °C)   Resp 15   Ht 5' 10\" (1.778 m)   Wt 73 kg (160 lb 15 oz)   SpO2 98%   BMI 23.09 kg/m²     Gen: Appears comfortable, no distress.   Pulm: comfortable respirations with no abnormal audible breath sounds  HEART: well perfused, no abnormal audible heart sounds  GI: abdomen flat. PLAN:  Informed consent discussion held, patient afforded an opportunity to ask questions and all questions answered. After being advised of the risks, benefits, and alternatives, the patient requested that we proceed and indicated so on a written consent form. Will proceed with procedure as planned.   Juarez Deluca MD

## 2021-11-16 NOTE — H&P
80 y.o. male for open access colonoscopy for screening   Additional data for completion of the targeted pre-endoscopy H&P will be provided under 'H&P interval notes'. Please see that document which will be attached to this.   Joe Chan MD

## 2021-11-16 NOTE — ANESTHESIA POSTPROCEDURE EVALUATION
Procedure(s):  COLONOSCOPY  ENDOSCOPIC POLYPECTOMY. MAC    Anesthesia Post Evaluation      Multimodal analgesia: multimodal analgesia used between 6 hours prior to anesthesia start to PACU discharge  Patient location during evaluation: bedside  Patient participation: complete - patient participated  Level of consciousness: awake  Pain management: adequate  Airway patency: patent  Anesthetic complications: no  Cardiovascular status: acceptable  Respiratory status: acceptable  Hydration status: acceptable        INITIAL Post-op Vital signs:   Vitals Value Taken Time   /64 11/16/21 1144   Temp 36.6 °C (97.8 °F) 11/16/21 1144   Pulse 51 11/16/21 1148   Resp 14 11/16/21 1148   SpO2 99 % 11/16/21 1148   Vitals shown include unvalidated device data.

## 2021-11-16 NOTE — ANESTHESIA PREPROCEDURE EVALUATION
Anesthetic History   No history of anesthetic complications            Review of Systems / Medical History  Patient summary reviewed, nursing notes reviewed and pertinent labs reviewed    Pulmonary      Recent URI    Smoker (quit 3 days)         Neuro/Psych   Within defined limits           Cardiovascular  Within defined limits                Exercise tolerance: >4 METS  Comments: Not on beta blocker   GI/Hepatic/Renal  Within defined limits              Endo/Other  Within defined limits           Other Findings   Comments:          Physical Exam    Airway  Mallampati: II  TM Distance: 4 - 6 cm  Neck ROM: normal range of motion   Mouth opening: Normal     Cardiovascular  Regular rate and rhythm,  S1 and S2 normal,  no murmur, click, rub, or gallop  Rhythm: regular  Rate: normal         Dental    Dentition: Poor dentition  Comments: No teeth on top, denies loose teeth on bottom   Pulmonary                 Abdominal  GI exam deferred       Other Findings            Anesthetic Plan    ASA: 3  Anesthesia type: MAC          Induction: Intravenous  Anesthetic plan and risks discussed with: Patient

## 2021-11-16 NOTE — PERIOP NOTES
Endoscopy discharge instructions have been reviewed and given to patient. The patient verbalized understanding and acceptance of instructions. Dr. Tl Tello discussed with patient procedure findings and next steps.

## 2021-11-16 NOTE — PERIOP NOTES
Received Report From Jhoana Cook  @ [phone], see anesthesia notes. Care of the patient transferred to procedure nurse Kyle Gutiérrez RN @ [phone]    Out of Procedure and sent to post-recovery @ [phone]    Post-recovery report given to Herlinda Villarreal RN  @ [phone]    Patient ABD remains soft and non-tender post procedure. Pt has no complaints at this time and tolerated the procedure well.      Endoscope was pre-cleaned at bedside immediately following procedure by George Hollins.

## 2021-11-16 NOTE — PROCEDURES
[address] [facility] RICHARDSON Arias MD  [phone]      2021    Colonoscopy Procedure Note  Quinten Ness  :  1940  Jeanne Medical Record Number: [id number]    Indications:     Screening colonoscopy  PCP:  James Pendleton MD  Anesthesia/Sedation: Conscious Sedation/Moderate Sedation/GETA, see notes  Endoscopist:  Dr. Ortiz See  Complications:  None  Estimated Blood Loss:  None    Permit:  The indications, risks, benefits and alternatives were reviewed with the patient or their decision maker who was provided an opportunity to ask questions and all questions were answered. The specific risks of colonoscopy with conscious sedation were reviewed, including but not limited to anesthetic complication, bleeding, adverse drug reaction, missed lesion, infection, IV site reactions, and intestinal perforation which would lead to the need for surgical repair. Alternatives to colonoscopy including radiographic imaging, observation without testing, or laboratory testing were reviewed including the limitations of those alternatives. After considering the options and having all their questions answered, the patient or their decision maker provided both verbal and written consent to proceed. Procedure in Detail:  After obtaining informed consent, positioning of the patient in the left lateral decubitus position, and conduction of a pre-procedure pause or \"time out\" the endoscope was introduced into the anus and advanced to the cecum, which was identified by the ileocecal valve and appendiceal orifice. The quality of the colonic preparation was good. A careful inspection was made as the colonoscope was withdrawn, findings and interventions are described below. Findings:   Five polyps today all <10mm. Ascending three, transverse one and rectum one.   All removed with cold snare, retrieved, and hemostasis confirmed. Specimens:    See above    Complications:   None; patient tolerated the procedure well. Impression:  Colon polyps but aged 80, so unlikely to require further colonoscopy for screening. Recommendations:     - Await pathology. Thank you for entrusting me with this patient's care. Please do not hesitate to contact me with any questions or if I can be of assistance with any of your other patients' GI needs. Signed By: Homar George MD                        November 16, 2021      Surgical assistant none. Implants none unless specified.

## 2021-11-16 NOTE — DISCHARGE INSTRUCTIONS
[address] [facility] RICHARDSON Stahl MD  [phone] November 16, 2021    Nikita Kapoor  YOB: 1940    COLONOSCOPY DISCHARGE INSTRUCTIONS    If there is redness at IV site you should apply warm compress to area. If redness or soreness persist contact Dr. Ronald Stahl' or your primary care doctor. There may be a slight amount of blood passed from the rectum. Gaseous discomfort may develop, but walking, belching will help relieve this. You may not operate a vehicle for 12 hours  You may not operate machinery or dangerous appliances for rest of today  You may not drink alcoholic beverages for 12 hours  Avoid making any critical decisions for 24 hours    DIET:  You may resume your normal diet, but some patients find that heavy or large meals may lead to indigestion or vomiting. I suggest a light meal as first food intake. MEDICATIONS:  The use of some over-the-counter pain medication may lead to bleeding after colon biopsies or polyp removal.  Tylenol (also called acetaminophen) is safe to take even if you have had colonoscopy with polyp removal.  Based on the procedure you had today you may not safely take aspirin or aspirin-like products for the next ten (10) days. Remember that Tylenol (also called acetaminophen) is safe to take after colonoscopy even if you have had biopsies or polyps removed. ACTIVITY:  You may resume your normal household activities, but it is recommended that you spend the remainder of the day resting -  avoid any strenuous activity. CALL DR. Fredy Cabot' OFFICE IF:  Increasing pain, nausea, vomiting  Abdominal distension (swelling)  Significant new or increased bleeding (oral or rectal)  Fever/Chills  Chest pain/shortness of breath                       Additional instructions: We found and removed small polyps today but no colon cancer. Great news.   No aspirin 10 days and I'll contact you with the polyp results by letter in about a week. It was an honor to be your doctor today. Please let me or my office staff know if you have any feedback about today's procedure. Leesa Saldana MD    Colonoscopy saves lives, and can prevent colon cancer. Everyone aged 48 or older needs colonoscopy.   Tell your family and friends: get the test!

## 2023-01-29 ENCOUNTER — HOSPITAL ENCOUNTER (INPATIENT)
Age: 83
LOS: 3 days | Discharge: NURSING, REHAB OR LONG-TERM CARE | DRG: 179 | End: 2023-02-01
Attending: EMERGENCY MEDICINE | Admitting: INTERNAL MEDICINE
Payer: MEDICARE

## 2023-01-29 ENCOUNTER — APPOINTMENT (OUTPATIENT)
Dept: CT IMAGING | Age: 83
DRG: 179 | End: 2023-01-29
Attending: EMERGENCY MEDICINE
Payer: MEDICARE

## 2023-01-29 ENCOUNTER — APPOINTMENT (OUTPATIENT)
Dept: GENERAL RADIOLOGY | Age: 83
DRG: 179 | End: 2023-01-29
Attending: EMERGENCY MEDICINE
Payer: MEDICARE

## 2023-01-29 DIAGNOSIS — R50.9 FEBRILE ILLNESS, ACUTE: ICD-10-CM

## 2023-01-29 DIAGNOSIS — U07.1 COVID: Primary | ICD-10-CM

## 2023-01-29 DIAGNOSIS — R41.0 CONFUSION: ICD-10-CM

## 2023-01-29 DIAGNOSIS — R60.0 BILATERAL LOWER EXTREMITY EDEMA: ICD-10-CM

## 2023-01-29 LAB
ALBUMIN SERPL-MCNC: 4 G/DL (ref 3.5–5.2)
ALBUMIN/GLOB SERPL: 1.3 (ref 1.1–2.2)
ALP SERPL-CCNC: 98 U/L (ref 40–129)
ALT SERPL-CCNC: 9 U/L (ref 10–50)
AMMONIA PLAS-SCNC: 29 UMOL/L (ref 16–60)
ANION GAP SERPL CALC-SCNC: 10 MMOL/L (ref 5–15)
APPEARANCE UR: CLEAR
AST SERPL-CCNC: 17 U/L (ref 10–50)
BACTERIA URNS QL MICRO: NEGATIVE /HPF
BASOPHILS # BLD: 0 K/UL (ref 0–1)
BASOPHILS NFR BLD: 0 % (ref 0–1)
BILIRUB SERPL-MCNC: 0.4 MG/DL (ref 0.2–1)
BILIRUB UR QL: NEGATIVE
BNP SERPL-MCNC: 87 PG/ML
BUN SERPL-MCNC: 12 MG/DL (ref 8–23)
BUN/CREAT SERPL: 9 (ref 12–20)
CALCIUM SERPL-MCNC: 9.4 MG/DL (ref 8.8–10.2)
CHLORIDE SERPL-SCNC: 106 MMOL/L (ref 98–107)
CO2 SERPL-SCNC: 28 MMOL/L (ref 22–29)
COLOR UR: ABNORMAL
COVID-19 RAPID TEST, COVR: DETECTED
CREAT SERPL-MCNC: 1.27 MG/DL (ref 0.7–1.2)
DIFFERENTIAL METHOD BLD: ABNORMAL
EOSINOPHIL # BLD: 0.1 K/UL (ref 0–0.4)
EOSINOPHIL NFR BLD: 1 % (ref 0–7)
EPITH CASTS URNS QL MICRO: ABNORMAL /LPF
ERYTHROCYTE [DISTWIDTH] IN BLOOD BY AUTOMATED COUNT: 14.6 % (ref 11.5–14.5)
FLUAV AG NPH QL IA: NEGATIVE
FLUBV AG NOSE QL IA: NEGATIVE
GLOBULIN SER CALC-MCNC: 3 G/DL (ref 2–4)
GLUCOSE SERPL-MCNC: 105 MG/DL (ref 65–100)
GLUCOSE UR STRIP.AUTO-MCNC: NEGATIVE MG/DL
HCT VFR BLD AUTO: 36.5 % (ref 36.6–50.3)
HGB BLD-MCNC: 12.6 G/DL (ref 12.1–17)
HGB UR QL STRIP: ABNORMAL
IMM GRANULOCYTES # BLD AUTO: 0 K/UL (ref 0–0.04)
IMM GRANULOCYTES NFR BLD AUTO: 0 % (ref 0–0.5)
KETONES UR QL STRIP.AUTO: NEGATIVE MG/DL
LACTATE SERPL-SCNC: 1.27 MMOL/L (ref 0.4–2)
LEUKOCYTE ESTERASE UR QL STRIP.AUTO: NEGATIVE
LYMPHOCYTES # BLD: 1.6 K/UL (ref 0.8–3.5)
LYMPHOCYTES NFR BLD: 22 % (ref 12–49)
MCH RBC QN AUTO: 34.3 PG (ref 26–34)
MCHC RBC AUTO-ENTMCNC: 34.5 G/DL (ref 30–36.5)
MCV RBC AUTO: 99.5 FL (ref 80–99)
MONOCYTES # BLD: 0.6 K/UL (ref 0–1)
MONOCYTES NFR BLD: 9 % (ref 5–13)
NEUTS SEG # BLD: 4.8 K/UL (ref 1.8–8)
NEUTS SEG NFR BLD: 68 % (ref 32–75)
NITRITE UR QL STRIP.AUTO: NEGATIVE
NRBC # BLD: 0 K/UL (ref 0–0.01)
NRBC BLD-RTO: 0 PER 100 WBC
PH UR STRIP: 7 (ref 5–8)
PLATELET # BLD AUTO: 165 K/UL (ref 150–400)
PMV BLD AUTO: 11.9 FL (ref 8.9–12.9)
POTASSIUM SERPL-SCNC: 3.8 MMOL/L (ref 3.5–5.1)
PROT SERPL-MCNC: 7 G/DL (ref 6.4–8.3)
PROT UR STRIP-MCNC: NEGATIVE MG/DL
RBC # BLD AUTO: 3.67 M/UL (ref 4.1–5.7)
RBC #/AREA URNS HPF: ABNORMAL /HPF (ref 0–5)
SODIUM SERPL-SCNC: 144 MMOL/L (ref 136–145)
SOURCE, COVRS: ABNORMAL
SP GR UR REFRACTOMETRY: 1.01 (ref 1–1.03)
UR CULT HOLD, URHOLD: NORMAL
UROBILINOGEN UR QL STRIP.AUTO: 0.2 EU/DL (ref 0.2–1)
WBC # BLD AUTO: 7.1 K/UL (ref 4.1–11.1)
WBC URNS QL MICRO: ABNORMAL /HPF (ref 0–4)

## 2023-01-29 PROCEDURE — 87804 INFLUENZA ASSAY W/OPTIC: CPT

## 2023-01-29 PROCEDURE — 83605 ASSAY OF LACTIC ACID: CPT

## 2023-01-29 PROCEDURE — 82140 ASSAY OF AMMONIA: CPT

## 2023-01-29 PROCEDURE — 65270000029 HC RM PRIVATE

## 2023-01-29 PROCEDURE — 80053 COMPREHEN METABOLIC PANEL: CPT

## 2023-01-29 PROCEDURE — 81001 URINALYSIS AUTO W/SCOPE: CPT

## 2023-01-29 PROCEDURE — 70450 CT HEAD/BRAIN W/O DYE: CPT

## 2023-01-29 PROCEDURE — 71045 X-RAY EXAM CHEST 1 VIEW: CPT

## 2023-01-29 PROCEDURE — 99285 EMERGENCY DEPT VISIT HI MDM: CPT

## 2023-01-29 PROCEDURE — 83880 ASSAY OF NATRIURETIC PEPTIDE: CPT

## 2023-01-29 PROCEDURE — 87635 SARS-COV-2 COVID-19 AMP PRB: CPT

## 2023-01-29 PROCEDURE — 36415 COLL VENOUS BLD VENIPUNCTURE: CPT

## 2023-01-29 PROCEDURE — 87040 BLOOD CULTURE FOR BACTERIA: CPT

## 2023-01-29 PROCEDURE — 85025 COMPLETE CBC W/AUTO DIFF WBC: CPT

## 2023-01-29 RX ORDER — ACETAMINOPHEN 325 MG/1
650 TABLET ORAL
Status: DISCONTINUED | OUTPATIENT
Start: 2023-01-29 | End: 2023-02-01 | Stop reason: HOSPADM

## 2023-01-29 RX ORDER — BENZONATATE 100 MG/1
100 CAPSULE ORAL
Status: DISCONTINUED | OUTPATIENT
Start: 2023-01-29 | End: 2023-02-01 | Stop reason: HOSPADM

## 2023-01-29 RX ORDER — ACETAMINOPHEN 650 MG/1
650 SUPPOSITORY RECTAL
Status: DISCONTINUED | OUTPATIENT
Start: 2023-01-29 | End: 2023-02-01 | Stop reason: HOSPADM

## 2023-01-29 RX ORDER — IPRATROPIUM BROMIDE AND ALBUTEROL SULFATE 2.5; .5 MG/3ML; MG/3ML
3 SOLUTION RESPIRATORY (INHALATION)
Status: DISCONTINUED | OUTPATIENT
Start: 2023-01-29 | End: 2023-01-30

## 2023-01-29 RX ORDER — POLYETHYLENE GLYCOL 3350 17 G/17G
17 POWDER, FOR SOLUTION ORAL DAILY PRN
Status: DISCONTINUED | OUTPATIENT
Start: 2023-01-29 | End: 2023-02-01 | Stop reason: HOSPADM

## 2023-01-29 RX ORDER — DEXAMETHASONE 6 MG/1
6 TABLET ORAL DAILY
Status: DISCONTINUED | OUTPATIENT
Start: 2023-01-30 | End: 2023-02-01 | Stop reason: HOSPADM

## 2023-01-29 RX ORDER — ENOXAPARIN SODIUM 100 MG/ML
40 INJECTION SUBCUTANEOUS DAILY
Status: DISCONTINUED | OUTPATIENT
Start: 2023-01-30 | End: 2023-02-01 | Stop reason: HOSPADM

## 2023-01-29 RX ORDER — ONDANSETRON 2 MG/ML
4 INJECTION INTRAMUSCULAR; INTRAVENOUS
Status: DISCONTINUED | OUTPATIENT
Start: 2023-01-29 | End: 2023-02-01 | Stop reason: HOSPADM

## 2023-01-29 RX ORDER — SODIUM CHLORIDE 0.9 % (FLUSH) 0.9 %
5-40 SYRINGE (ML) INJECTION EVERY 8 HOURS
Status: DISCONTINUED | OUTPATIENT
Start: 2023-01-29 | End: 2023-02-01 | Stop reason: HOSPADM

## 2023-01-29 RX ORDER — ONDANSETRON 4 MG/1
4 TABLET, ORALLY DISINTEGRATING ORAL
Status: DISCONTINUED | OUTPATIENT
Start: 2023-01-29 | End: 2023-02-01 | Stop reason: HOSPADM

## 2023-01-29 RX ORDER — SODIUM CHLORIDE 0.9 % (FLUSH) 0.9 %
5-40 SYRINGE (ML) INJECTION AS NEEDED
Status: DISCONTINUED | OUTPATIENT
Start: 2023-01-29 | End: 2023-02-01 | Stop reason: HOSPADM

## 2023-01-30 ENCOUNTER — APPOINTMENT (OUTPATIENT)
Dept: VASCULAR SURGERY | Age: 83
DRG: 179 | End: 2023-01-30
Attending: INTERNAL MEDICINE
Payer: MEDICARE

## 2023-01-30 LAB
CRP SERPL HS-MCNC: 6.3 MG/L
GLUCOSE BLD STRIP.AUTO-MCNC: 107 MG/DL (ref 65–117)
SERVICE CMNT-IMP: NORMAL

## 2023-01-30 PROCEDURE — 94664 DEMO&/EVAL PT USE INHALER: CPT

## 2023-01-30 PROCEDURE — 97535 SELF CARE MNGMENT TRAINING: CPT

## 2023-01-30 PROCEDURE — 97116 GAIT TRAINING THERAPY: CPT

## 2023-01-30 PROCEDURE — 94761 N-INVAS EAR/PLS OXIMETRY MLT: CPT

## 2023-01-30 PROCEDURE — 86141 C-REACTIVE PROTEIN HS: CPT

## 2023-01-30 PROCEDURE — 2709999900 HC NON-CHARGEABLE SUPPLY

## 2023-01-30 PROCEDURE — 36415 COLL VENOUS BLD VENIPUNCTURE: CPT

## 2023-01-30 PROCEDURE — 94640 AIRWAY INHALATION TREATMENT: CPT

## 2023-01-30 PROCEDURE — 82962 GLUCOSE BLOOD TEST: CPT

## 2023-01-30 PROCEDURE — 77010033678 HC OXYGEN DAILY

## 2023-01-30 PROCEDURE — 93970 EXTREMITY STUDY: CPT

## 2023-01-30 PROCEDURE — 74011000250 HC RX REV CODE- 250: Performed by: INTERNAL MEDICINE

## 2023-01-30 PROCEDURE — 74011250637 HC RX REV CODE- 250/637: Performed by: INTERNAL MEDICINE

## 2023-01-30 PROCEDURE — 74011250636 HC RX REV CODE- 250/636: Performed by: INTERNAL MEDICINE

## 2023-01-30 PROCEDURE — 65270000029 HC RM PRIVATE

## 2023-01-30 PROCEDURE — 97165 OT EVAL LOW COMPLEX 30 MIN: CPT

## 2023-01-30 PROCEDURE — 97162 PT EVAL MOD COMPLEX 30 MIN: CPT

## 2023-01-30 PROCEDURE — 74011250637 HC RX REV CODE- 250/637: Performed by: HOSPITALIST

## 2023-01-30 RX ORDER — IPRATROPIUM BROMIDE AND ALBUTEROL SULFATE 2.5; .5 MG/3ML; MG/3ML
3 SOLUTION RESPIRATORY (INHALATION)
Status: DISCONTINUED | OUTPATIENT
Start: 2023-01-30 | End: 2023-01-30

## 2023-01-30 RX ORDER — ALBUTEROL SULFATE 90 UG/1
2 AEROSOL, METERED RESPIRATORY (INHALATION)
Status: DISCONTINUED | OUTPATIENT
Start: 2023-01-30 | End: 2023-02-01 | Stop reason: HOSPADM

## 2023-01-30 RX ORDER — GUAIFENESIN 600 MG/1
600 TABLET, EXTENDED RELEASE ORAL EVERY 12 HOURS
Status: DISCONTINUED | OUTPATIENT
Start: 2023-01-30 | End: 2023-02-01 | Stop reason: HOSPADM

## 2023-01-30 RX ADMIN — ALBUTEROL SULFATE 2 PUFF: 90 AEROSOL, METERED RESPIRATORY (INHALATION) at 19:58

## 2023-01-30 RX ADMIN — ACETAMINOPHEN 650 MG: 325 TABLET ORAL at 02:24

## 2023-01-30 RX ADMIN — ALBUTEROL SULFATE 2 PUFF: 90 AEROSOL, METERED RESPIRATORY (INHALATION) at 09:36

## 2023-01-30 RX ADMIN — TIOTROPIUM BROMIDE INHALATION SPRAY 2 PUFF: 3.12 SPRAY, METERED RESPIRATORY (INHALATION) at 09:36

## 2023-01-30 RX ADMIN — ACETAMINOPHEN 650 MG: 325 TABLET ORAL at 20:45

## 2023-01-30 RX ADMIN — GUAIFENESIN 600 MG: 600 TABLET ORAL at 09:08

## 2023-01-30 RX ADMIN — GUAIFENESIN 600 MG: 600 TABLET ORAL at 02:34

## 2023-01-30 RX ADMIN — SODIUM CHLORIDE, PRESERVATIVE FREE 10 ML: 5 INJECTION INTRAVENOUS at 14:04

## 2023-01-30 RX ADMIN — ENOXAPARIN SODIUM 40 MG: 100 INJECTION SUBCUTANEOUS at 09:08

## 2023-01-30 RX ADMIN — POLYETHYLENE GLYCOL 3350 17 G: 17 POWDER, FOR SOLUTION ORAL at 09:08

## 2023-01-30 RX ADMIN — BENZONATATE 100 MG: 100 CAPSULE ORAL at 20:45

## 2023-01-30 RX ADMIN — GUAIFENESIN 600 MG: 600 TABLET ORAL at 20:45

## 2023-01-30 RX ADMIN — SODIUM CHLORIDE, PRESERVATIVE FREE 10 ML: 5 INJECTION INTRAVENOUS at 20:45

## 2023-01-30 RX ADMIN — SODIUM CHLORIDE, PRESERVATIVE FREE 10 ML: 5 INJECTION INTRAVENOUS at 06:10

## 2023-01-30 RX ADMIN — BENZONATATE 100 MG: 100 CAPSULE ORAL at 02:23

## 2023-01-30 RX ADMIN — DEXAMETHASONE 6 MG: 6 TABLET ORAL at 09:08

## 2023-01-30 RX ADMIN — ALBUTEROL SULFATE 2 PUFF: 90 AEROSOL, METERED RESPIRATORY (INHALATION) at 13:27

## 2023-01-30 NOTE — ED PROVIDER NOTES
[de-identified] male with history of CKD presents presents to the emergency department by EMS with concern for generalized weakness, fatigue at home. On my evaluation he has no complaints. EMS reported that the daughter told them he has been sitting in his chair all day, has been for the past couple of days. Has been incontinent of urine with EMS. Denies fevers, chest pain, shortness of breath. He presents with bilateral lower extremity swelling in his unsure how long this is been going on for. Is unclear whether the daughter is coming to the emergency department today or not. He apparently still lives at home with her. The history is provided by the patient, the EMS personnel and medical records.    Lethargy       Past Medical History:   Diagnosis Date    CKD (chronic kidney disease) stage 3, GFR 30-59 ml/min ([facility]) 10/03/2014    Other ill-defined conditions(799.89)     recent cold, taking benadryl for sinus congestion    Tobacco abuse        Past Surgical History:   Procedure Laterality Date    COLONOSCOPY N/A 11/16/2021    COLONOSCOPY performed by Jeane Will MD at [address] [facility] OTHER SURGICAL      colonoscopy    UT ABDOMEN SURGERY [address] UNLISTED           Family History:   Problem Relation Age of Onset    Other Other         patient does not know       Social History     Socioeconomic History    Marital status: SINGLE     Spouse name: Not on file    Number of children: Not on file    Years of education: Not on file    Highest education level: Not on file   Occupational History    Not on file   Tobacco Use    Smoking status: Every Day     Packs/day: 0.25     Types: Cigarettes    Smokeless tobacco: Never   Substance and Sexual Activity    Alcohol use: No    Drug use: No    Sexual activity: Not on file   Other Topics Concern    Not on file   Social History Narrative    Not on file     Social Determinants of Health     Financial Resource Strain: Not on file   Food Insecurity: Not on file   Transportation Needs: Not on file   Physical Activity: Not on file   Stress: Not on file   Social Connections: Not on file   Intimate Partner Violence: Not on file   Housing Stability: Not on file         ALLERGIES: Patient has no known allergies. Review of Systems    There were no vitals filed for this visit. Physical Exam  Vitals and nursing note reviewed. Constitutional:       General: He is not in acute distress. Appearance: He is ill-appearing (Chronically ill-appearing). HENT:      Mouth/Throat:      Mouth: Mucous membranes are dry. Neck:      Comments: No meningeal signs  Cardiovascular:      Rate and Rhythm: Normal rate. Pulses: Normal pulses. Pulmonary:      Effort: Pulmonary effort is normal. No respiratory distress. Breath sounds: Normal breath sounds. No wheezing, rhonchi or rales. Abdominal:      Palpations: Abdomen is soft. Tenderness: There is no abdominal tenderness. Musculoskeletal:      Cervical back: Normal range of motion. No rigidity. Right lower leg: Edema present. Left lower leg: Edema present. Skin:     General: Skin is warm and dry. Capillary Refill: Capillary refill takes less than 2 seconds. Neurological:      General: No focal deficit present. Mental Status: He is alert. Psychiatric:         Behavior: Behavior normal.        Medical Decision Making  53-year-old male presents as above with altered mental status, fever with positive COVID. Discussed with his daughter at bedside. Given his confusion and difficulty managing at home will admit to the hospital for further management. He may require placement after the acute hospitalization. He has been mildly hypoxic at 91 to 93% range. Amount and/or Complexity of Data Reviewed  Independent Historian: caregiver  External Data Reviewed: notes. Labs: ordered. Radiology: ordered and independent interpretation performed.  Decision-making details documented in ED Course. ED Course as of 01/29/23 2337   Jh Cano Jan 29, 2023   7510 I have independently viewed the obtained radiographic images and note chest x-ray and CT head without acute findings. Will await radiology read. [JM]      ED Course User Index  [JM] Dione Garcia MD       Procedures          Perfect Serve Consult for Admission  11:39 PM    ED Room Number: C03/C03  Patient Name and age:  Yesenia Murrell [de-identified] y.o.  male  Working Diagnosis:   1. COVID    2. Confusion    3. Febrile illness, acute    4. Bilateral lower extremity edema        COVID-19 Suspicion:  yes  Sepsis present:  no  Reassessment needed: N/A  Code Status:  Full Code  Readmission: no  Isolation Requirements:  yes  Recommended Level of Care:  telemetry  Department:[facility]  Other:  mild hypoxia, 91-93% RA.   Not attentive to personal hygiene at home, not thriving at home

## 2023-01-30 NOTE — H&P
Hospitalist Admission Note    NAME: Lola Mast   :  1940   MRN:  [id number]     Date/Time:  2023 2:26 AM    Patient PCP: Ludy Suh MD       Subjective:   CHIEF COMPLAINT:  weakness    HISTORY OF PRESENT ILLNESS:     Lola Mast is a [de-identified] y.o.  male with history of CKD stage IIIa and frequent falls presenting with cute onset of generalized weakness and fatigue. The patient moved in with his daughter and has had been having increasing falls. He reports no significant injury. He also admits to mild dyspnea and chest congestion. No significant cough. He was found to have a fever of 102. COVID-19 testing was positive. Patient states that he has been vaccinated. He has no other sick contacts. In addition to the dyspnea he has had increasing bilateral lower extremity edema for the last few weeks. He has no pain in his legs. No rash. The patient desaturated to 88% on room air and was placed on 2 L via nasal cannula. He is being admitted to the hospitalist service for further evaluation and treatment. Past Medical History:   Diagnosis Date    CKD (chronic kidney disease) stage 3, GFR 30-59 ml/min ([facility]) 10/03/2014    Other ill-defined conditions(799.89)     recent cold, taking benadryl for sinus congestion    Tobacco abuse       Past Surgical History:   Procedure Laterality Date    COLONOSCOPY N/A 2021    COLONOSCOPY performed by Suresh Warren MD at [facility] ENDOSCOPY    HX HERNIA REPAIR      HX OTHER SURGICAL      colonoscopy    TX UNLISTED PROCEDURE ABDOMEN PERITONEUM & OMENTUM       Social History     Tobacco Use    Smoking status: Every Day     Packs/day: 0.25     Types: Cigarettes    Smokeless tobacco: Never   Substance Use Topics    Alcohol use: No      Family History   Problem Relation Age of Onset    Other Other         patient does not know            No Known Allergies     Prior to Admission medications    Medication Sig Start Date End Date Taking?  Authorizing Provider   amoxicillin 500 mg tab Take  by mouth. Provider, Historical   traMADol (ULTRAM) 50 mg tablet Take 1 Tab by mouth every six (6) hours as needed for Pain. Max Daily Amount: 200 mg. Patient not taking: Reported on 11/16/2021 1/6/19   Miroslava Pittman, DO   Aspirin-Caffeine [facility]) 845-65 mg pwpk Take  by mouth. Other, MD Randal       REVIEW OF SYSTEMS:      General:   Positive fever  Eyes: No changes in vision. ENT:  No rhinorrhea, pharyngitis, or congestion  Lungs: Positive mild dyspnea and chest congestion. No cough or sputum production. Heart:   No chest pain or palpitations. Positive bilateral lower extremity edema. GI:  No abdominal pain, N/V, diarrhea, constipation  :  No dysuria or hematuria  MSK:  No acute pain or trauma  Skin:  No new lesions  Neuro:  No focal numbess or weakness  Psych:  No mood changes. Objective:   VITALS:    Visit Vitals  BP (!) 141/70 (BP 1 Location: Right upper arm)   Pulse 67   Temp (!) 102.2 °F (39 °C)   Resp 20   Ht 5' 10\" (1.778 m)   Wt 78.5 kg (173 lb 1 oz)   SpO2 91%   BMI 24.83 kg/m²       PHYSICAL EXAM:      General:   No acute distress, resting comfortably in bed. Head:  Normocephalic, atraumatic  Eyes:  PERRLA. EOM intact. ENT:  Moist mucus membranes. No oral lesions. Neck:  Supple. No LAD. Heart:  RRR, No murmurs. Lungs: Scant rhonchi and congestion. Non-labored breathing. Abdomen:  Soft . Nondistended. NTTP. BS present. :  No suprapubic tenderness. No jacobs. Extremities: 1+ pitting edema bilateral lower extremities. Musculoskeletal:  Atraumatic. No gross deformity. Skin:  No rash  Neuro:  Alert and interactive. No focal weakness. Generalized weakness. Psych:  Mood stable. Procedures: see electronic medical records for all procedures/Xrays and details which were not copied into this note but were reviewed prior to creation of Plan.     LAB DATA REVIEWED:    Recent Results (from the past 24 hour(s))   CBC WITH AUTOMATED DIFF    Collection Time: 01/29/23  9:53 PM   Result Value Ref Range    WBC 7.1 4.1 - 11.1 K/uL    RBC 3.67 (L) 4.10 - 5.70 M/uL    HGB 12.6 12.1 - 17.0 g/dL    HCT 36.5 (L) 36.6 - 50.3 %    MCV 99.5 (H) 80.0 - 99.0 FL    MCH 34.3 (H) 26.0 - 34.0 PG    MCHC 34.5 30.0 - 36.5 g/dL    RDW 14.6 (H) 11.5 - 14.5 %    PLATELET 444 549 - 191 K/uL    MPV 11.9 8.9 - 12.9 FL    NRBC 0.0 0  WBC    ABSOLUTE NRBC 0.00 0.00 - 0.01 K/uL    NEUTROPHILS 68 32 - 75 %    LYMPHOCYTES 22 12 - 49 %    MONOCYTES 9 5 - 13 %    EOSINOPHILS 1 0 - 7 %    BASOPHILS 0 0 - 1 %    IMMATURE GRANULOCYTES 0 0 - 0.5 %    ABS. NEUTROPHILS 4.8 1.8 - 8.0 K/UL    ABS. LYMPHOCYTES 1.6 0.8 - 3.5 K/UL    ABS. MONOCYTES 0.6 0.0 - 1.0 K/UL    ABS. EOSINOPHILS 0.1 0.0 - 0.4 K/UL    ABS. BASOPHILS 0.0 0 - 1 K/UL    ABS. IMM. GRANS. 0.0 0.00 - 0.04 K/UL    DF AUTOMATED     METABOLIC PANEL, COMPREHENSIVE    Collection Time: 01/29/23  9:53 PM   Result Value Ref Range    Sodium 144 136 - 145 mmol/L    Potassium 3.8 3.5 - 5.1 mmol/L    Chloride 106 98 - 107 mmol/L    CO2 28 22 - 29 mmol/L    Anion gap 10 5 - 15 mmol/L    Glucose 105 (H) 65 - 100 mg/dL    BUN 12 8 - 23 MG/DL    Creatinine 1.27 (H) 0.70 - 1.20 MG/DL    BUN/Creatinine ratio 9 (L) 12 - 20      eGFR 57 (L) >60 ml/min/1.73m2    Calcium 9.4 8.8 - 10.2 MG/DL    Bilirubin, total 0.4 0.2 - 1.0 MG/DL    ALT (SGPT) 9 (L) 10 - 50 U/L    AST (SGOT) 17 10 - 50 U/L    Alk.  phosphatase 98 40 - 129 U/L    Protein, total 7.0 6.4 - 8.3 g/dL    Albumin 4.0 3.5 - 5.2 g/dL    Globulin 3.0 2.0 - 4.0 g/dL    A-G Ratio 1.3 1.1 - 2.2     NT-PRO BNP    Collection Time: 01/29/23  9:53 PM   Result Value Ref Range    NT pro-BNP 87 <451 PG/ML   AMMONIA, PLASMA    Collection Time: 01/29/23  9:53 PM   Result Value Ref Range    Ammonia, plasma 29 16 - 60 umol/L   LACTIC ACID, PLASMA    Collection Time: 01/29/23 10:10 PM   Result Value Ref Range    Lactic acid, plasma 1.27 0.4 - 2.0 mmol/L   URINALYSIS W/MICROSCOPIC Collection Time: 01/29/23 10:51 PM   Result Value Ref Range    Color YELLOW/STRAW      Appearance CLEAR CLEAR      Specific gravity 1.015 1.003 - 1.030      pH (UA) 7.0 5.0 - 8.0      Protein Negative NEG mg/dL    Glucose Negative NEG mg/dL    Ketone Negative NEG mg/dL    Bilirubin Negative NEG      Blood TRACE (A) NEG      Urobilinogen 0.2 0.2 - 1.0 EU/dL    Nitrites Negative NEG      Leukocyte Esterase Negative NEG      WBC 0-4 0 - 4 /hpf    RBC 0-5 0 - 5 /hpf    Epithelial cells FEW FEW /lpf    Bacteria Negative NEG /hpf   URINE CULTURE HOLD SAMPLE    Collection Time: 01/29/23 10:51 PM    Specimen: Urine   Result Value Ref Range    Urine culture hold        Urine on hold in Microbiology dept for 2 days. If unpreserved urine is submitted, it cannot be used for addtional testing after 24 hours, recollection will be required. INFLUENZA A+B VIRAL AGS    Collection Time: 01/29/23 11:01 PM   Result Value Ref Range    Influenza A Antigen Negative NEG      Influenza B Antigen Negative NEG     COVID-19 RAPID TEST    Collection Time: 01/29/23 11:01 PM   Result Value Ref Range    Specimen source Nasopharyngeal      COVID-19 rapid test Detected (AA) NOTD         Given the patient's current clinical presentation, I have a high level of concern for decompensation if discharged from the emergency department. Complex decision making was performed, which includes reviewing the patient's available past medical records, laboratory results, and x-ray films. ASSESSMENT and PLAN:     Active Problems:    COVID (1/29/2023)        29-year-old man with history of CKD stage IIIa presenting with acute onset of dyspnea, chest congestion, and fever secondary to COVID-19, with generalized weakness and increasing falls at home. Acute hypoxic respiratory failure  COVID-19  Acute febrile illness  -Admit the patient to the hospitalist service. Continue supplemental oxygen to keep O2 sats greater than 90%. Begin dexamethasone. Begin scheduled nebulizers and Mucinex. Tessalon Perles as needed cough. -Chest x-ray was clear.  -Check D-dimer and CRP  -Consult pulmonology  -No empiric antibiotics for now  -Tylenol as needed fever    Bilateral lower extremity edema  -Check venous Doppler ultrasound to evaluate for DVT  -Check echocardiogram  -Prophylactic Lovenox for now, increase to therapeutic anticoagulation if venous Doppler positive    Frequent falls  Generalized weakness  -Fall precautions  -Consult PT and OT as well as case management for dispo planning    CKD stage IIIa  -Creatinine at baseline  -Monitor BMP  -No IV fluids for now due to COVID-19        DVT ppx:  lovenox    Dispo:  HH vs SNF    FULL CODE        Risk of deterioration: high         Total time spent with patient care: 45 min. I personally saw and examined the patient during this time period. Care Plan discussed with: patient, nurse    Discussed:  Care Plan       I have personally reviewed the radiographs, laboratory data in Epic and decisions and statements above are based partially on this personal interpretation.       Signed: Gayla Dinh MD

## 2023-01-30 NOTE — ED NOTES
Pt.attempting to get OOB. Urine on floor. Linens changed. Pt.back to bed with side rails up x 2. Bed alarm remains in place. New male purewick in place. Pt.again shown call bell and instructed to call with any needs.  Pt.to be admitted to [facility]

## 2023-01-30 NOTE — CONSULTS
[facility]     Name: Antonio Street MRN: [id number]   : 1940 Hospital: [address]   Date: 2023        Impression Plan   Acute respiratory failure  Hypoxia   Bronchitis- suspect pt has underlying COPD  CKD  Tobacco abuse               Suspect that pt has underlying COPD and is having a COPD exacerbation due to COVID 19 virus. Continue dexamethasone  CXR tomorrow  No infiltrates on CXR, will hold on baricitinib for now  Difficult to discern how long pt has been sick since he denies sxs. PT/OT  OOB into chair           Radiology  ( personally reviewed) CXR reviewed: no infiltrates   ABG No results for input(s): PHI, PO2I, PCO2I in the last 72 hours. Subjective     [de-identified] yo with PMHx of CKD and tobacco abuse who presents with generalized fatigues and fevers. Pt himself cannot remember why he was brought to the hospital. He denies shortness of breath or cough. Does not know how long he has felt sick. Has smoked 3/4 ppd since his teenages. Denies any hx of lung disease. On admission he was hypoxic and febrile. He is currently on 2 liters. Is COVID 19 vaccinated    Review of Systems:  Review of systems not obtained due to patient factors. Past Medical History:   Diagnosis Date    CKD (chronic kidney disease) stage 3, GFR 30-59 ml/min ([facility]) 10/03/2014    Other ill-defined conditions(799.89)     recent cold, taking benadryl for sinus congestion    Tobacco abuse       Past Surgical History:   Procedure Laterality Date    COLONOSCOPY N/A 2021    COLONOSCOPY performed by Chiqui Man MD at [facility] OTHER SURGICAL      colonoscopy    [place] UNLISTED [address]        Prior to Admission medications    Medication Sig Start Date End Date Taking? Authorizing Provider   amoxicillin 500 mg tab Take  by mouth.     Provider, Historical   traMADol (ULTRAM) 50 mg tablet Take 1 Tab by mouth every six (6) hours as needed for Pain. Max Daily Amount: 200 mg. Patient not taking: Reported on 11/16/2021 1/6/19   Prashant Treviño,    Aspirin-Caffeine [facility]) 845-65 mg pwpk Take  by mouth. Other, MD Randal     Current Facility-Administered Medications   Medication Dose Route Frequency    guaiFENesin ER (MUCINEX) tablet 600 mg  600 mg Oral Q12H    albuterol (PROVENTIL HFA, VENTOLIN HFA, PROAIR HFA) inhaler 2 Puff  2 Puff Inhalation Q6H RT    tiotropium bromide (SPIRIVA RESPIMAT) 2.5 mcg /actuation  2 Puff Inhalation DAILY    sodium chloride (NS) flush 5-40 mL  5-40 mL IntraVENous Q8H    enoxaparin (LOVENOX) injection 40 mg  40 mg SubCUTAneous DAILY    dexAMETHasone (DECADRON) tablet 6 mg  6 mg Oral DAILY     No Known Allergies   Social History     Tobacco Use    Smoking status: Every Day     Packs/day: 0.25     Types: Cigarettes    Smokeless tobacco: Never   Substance Use Topics    Alcohol use: No      Family History   Problem Relation Age of Onset    Other Other         patient does not know          Laboratory: I have personally reviewed the critical care flowsheet and labs. Recent Labs     01/29/23  2153   WBC 7.1   HGB 12.6   HCT 36.5*        Recent Labs     01/29/23  2153      K 3.8      CO2 28   *   BUN 12   CREA 1.27*   CA 9.4   ALB 4.0   ALT 9*       Objective:     Mode Rate Tidal Volume Pressure FiO2 PEEP                    Vital Signs:     TMAX(24)      Intake/Output:   Last shift:         Last 3 shifts: No intake/output data recorded. [id number] intake or output data in the 24 hours ending 01/30/23 0847  EXAM:   GENERAL: awake, alert, HEENT:  PERRL, EOMI, no alar flaring or epistaxis, oral mucosa moist without cyanosis, NECK:  no jugular vein distention, no retractions, no thyromegaly or masses, LUNGS: distant breathsounds bilaterally HEART:  Regular rate and rhythm with no MGR; trace edema is present, ABDOMEN:  soft with no tenderness, bowel sounds present, EXTREMITIES:  warm with no cyanosis, SKIN:  no jaundice or ecchymosis, and NEUROLOGIC:  alert and oriented, grossly non-focal    Natalie Winkler MD  [facility]

## 2023-01-30 NOTE — PROGRESS NOTES
[facility]  [address] [place] [address]  [phone]         Hospitalist Progress Note        NAME:  Cynthia Nichols   :  1940   MRN:  [id number]    Date/Time:  2023     Patient PCP:  Eldon Gonzalez MD    Emergency Contact:    Extended Emergency Contact Information  Primary Emergency Contact:  [facility] [address] Phone: [phone]  Mobile Phone: [phone]  Relation: Friend      Code: Full Code     Isolation Precautions: Droplet Plus        Subjective:     REASON FOR VISIT:  Recheck at 23 and hypoxia    HPI & INTERVAL HISTORY:     Mr. Marlys Freeman is a [de-identified] y.o. male with history that includes CKD stage IIIa and apparently dementia presents with weakness with frequent falls and fever. Found to be COVID-19 positive and hypoxic and started on oxygen. : Patient seen and examined. Appears somewhat confused. Had no complaints and does report mild shortness of breath currently on 2 L nasal cannula. Denies chest pain and abdominal pain.       ALLERGIES  No Known Allergies         Objective:      Visit Vitals  BP (!) 120/59 (BP 1 Location: Left upper arm, BP Patient Position: Lying)   Pulse 61   Temp 99 °F (37.2 °C)   Resp 18   Ht 5' 10\" (1.778 m)   Wt 78.5 kg (173 lb 1 oz)   SpO2 96%   BMI 24.83 kg/m²       General: confused and no distress  Head: Normocephalic, without obvious abnormality, atraumatic  Eyes: PERRL, EOMI, anicteric sclerae, and conjuntiva clear  ENT: lips, mucosa, and tongue normal  Neck: normal, supple, and no tenderness  Lungs: clear to auscultation with good breath sounds and normal respiratory effort  Heart: S1, S2 normal, regular rate, and regular rhythm  Abd: not distended, soft, nontender, BS present and normactive  Ext: no cyanosis and no edema  Skin: normal skin color, no rashes, and texture normal  Neuro: Oriented only to self  Psych: not anxious, cooperative, appropriate affect      Medications:  Current Facility-Administered Medications   Medication Dose Route Frequency    guaiFENesin ER (MUCINEX) tablet 600 mg  600 mg Oral Q12H    albuterol (PROVENTIL HFA, VENTOLIN HFA, PROAIR HFA) inhaler 2 Puff  2 Puff Inhalation Q6H RT    tiotropium bromide (SPIRIVA RESPIMAT) 2.5 mcg /actuation  2 Puff Inhalation DAILY    sodium chloride (NS) flush 5-40 mL  5-40 mL IntraVENous Q8H    sodium chloride (NS) flush 5-40 mL  5-40 mL IntraVENous PRN    acetaminophen (TYLENOL) tablet 650 mg  650 mg Oral Q6H PRN    Or    acetaminophen (TYLENOL) suppository 650 mg  650 mg Rectal Q6H PRN    polyethylene glycol (MIRALAX) packet 17 g  17 g Oral DAILY PRN    ondansetron (ZOFRAN ODT) tablet 4 mg  4 mg Oral Q8H PRN    Or    ondansetron (ZOFRAN) injection 4 mg  4 mg IntraVENous Q6H PRN    enoxaparin (LOVENOX) injection 40 mg  40 mg SubCUTAneous DAILY    dexAMETHasone (DECADRON) tablet 6 mg  6 mg Oral DAILY    benzonatate (TESSALON) capsule 100 mg  100 mg Oral TID PRN        Labs:  Recent Labs     01/29/23  2153   WBC 7.1   HGB 12.6   HCT 36.5*        Recent Labs     01/29/23  2153      K 3.8      CO2 28   *   BUN 12   CREA 1.27*   CA 9.4   ALB 4.0   TBILI 0.4   ALT 9*       Radiology:  CT HEAD WO CONT    Result Date: 1/29/2023  No acute abnormality. XR CHEST PORT    Result Date: 1/29/2023  No acute process. I personally reviewed and interpreted the imaging studies and agree with official reading    The chart, ER course, labs, imaging studies, and medications was reviewed by me on: January 30, 2023         Assessment/Plan:      [de-identified] man with history of CKD stage IIIa presenting with acute onset of dyspnea, chest congestion, and fever secondary to COVID-19, with generalized weakness and increasing falls at home.      Acute hypoxic respiratory failure due to COVID-19 virus infection also causing acute febrile illness  Continue with bronchodilators, Mucinex, Tessalon Perles, and dexamethasone  No need for antibiotics at this time  Supportive care  Chest x-ray was clear  Follow inflammatory markers     Bilateral lower extremity edema  Lower extremity Dopplers negative for DVT  Check echocardiogram  Prophylactic Lovenox for now, increase to therapeutic anticoagulation if venous Doppler positive     Frequent falls  Generalized weakness  Fall precautions  Consult PT and OT as well as case management for dispo planning     CKD stage IIIa  Creatinine at baseline  Monitor BMP  No IV fluids for now due to COVID-19     Body mass index is 24.83 kg/m².:  18.5 - 24.9:  Normal weight    F: None  E: Monitor  N: ADULT DIET Regular       Risk of deterioration: high      Discussed:  Pt's condition, Imaging findings, Lab findings, Assessment, and Care Plan discussed with: Patient, RN, and Care Manager    Prophylaxis:  Lovenox SQ    Anticipated discharge disposition:  [place] [address] or SNF    HR DC Barriers: Currently not medically stable for discharge      Total time: -35- minutes **I personally saw and examined the patient during this time period**                   ___________________________________________________    Signed:    Olya Thomason MD

## 2023-01-30 NOTE — PROGRESS NOTES
Message left for primary contact, Brent Newman, to contact cm at the nursing station to begin discharge planning. Reviewed in IDR as well.

## 2023-01-30 NOTE — PROGRESS NOTES
Problem: Mobility Impaired (Adult and Pediatric)  Goal: *Acute Goals and Plan of Care (Insert Text)  Description: FUNCTIONAL STATUS PRIOR TO ADMISSION: Pt unable to provide baseline/history at evaluation due to cognitive status. He reports independent baseline functional mobility at community level including driving. HOME SUPPORT PRIOR TO ADMISSION: Per chart pt recently moved to daughter's home. Physical Therapy Goals  Initiated 1/30/2023  1. Patient will move from supine to sit and sit to supine  in bed with supervision/set-up within 7 day(s). 2.  Patient will transfer from bed to chair and chair to bed with supervision/set-up using the least restrictive device within 7 day(s). 3.  Patient will perform sit to stand with supervision/set-up within 7 day(s). 4.  Patient will ambulate with supervision/set-up for 150 feet with the least restrictive device within 7 day(s). 5.  Patient will ascend/descend 4 stairs with one handrail(s) with supervision/set-up within 7 day(s). Outcome: Not Met   PHYSICAL THERAPY EVALUATION  Patient: Adrian Ruano (89 y.o. male)  Date: 1/30/2023  Primary Diagnosis: COVID [U07.1]       Precautions:   Fall, Skin (droplet +)    ASSESSMENT  Based on the objective data described below, the patient presents with decreased LE strength and AROM, impaired balance, decreased endurance, disorientation, and limited functional mobility on day 1 of admission with complaints of weakness and fatigue. Medical work-up revealing Covid-19 and COPD exacerbation per chart. Pt with history of falls and presents today with poor insight into deficits. He requires repeated cues for some functional tasks and assistance with motor planning in complex environments. He mobilizes to edge of bed, transfers to RW, and ambulates to commode followed by gait to chair. Pt requires constant assist for ambulation though attempts to carry RW despite cues.  Alternative RW with posterior glides supplied to room for next treatment. SpO2 86% with activity using room air and rebounds to 95% with seated rest and re-application of 2LNCO2 as found. Current Level of Function Impacting Discharge (mobility/balance): mod A some transfers    Functional Outcome Measure: The patient scored 1 on the tinetti outcome measure which is indicative of high fall risk. Other factors to consider for discharge: none additional     Patient will benefit from skilled therapy intervention to address the above noted impairments. PLAN :  Recommendations and Planned Interventions: bed mobility training, transfer training, gait training, therapeutic exercises, neuromuscular re-education, edema management/control, patient and family training/education, and therapeutic activities      Frequency/Duration: Patient will be followed by physical therapy:  5 times a week to address goals. Recommendation for discharge: (in order for the patient to meet his/her long term goals)  Therapy up to 5 days/week in SNF setting    This discharge recommendation:  Has not yet been discussed the attending provider and/or case management    IF patient discharges home will need the following DME: to be determined (TBD)         SUBJECTIVE:   Patient stated Do you want me to sit in the chair?  1 min after instructions to do so and while standing beside chair. Pt received supine, agreeable to PT and cleared by RN.       OBJECTIVE DATA SUMMARY:   HISTORY:    Past Medical History:   Diagnosis Date    CKD (chronic kidney disease) stage 3, GFR 30-59 ml/min ([address] 10/03/2014    Other ill-defined conditions(799.89)     recent cold, taking benadryl for sinus congestion    Tobacco abuse      Past Surgical History:   Procedure Laterality Date    COLONOSCOPY N/A 11/16/2021    COLONOSCOPY performed by Olaf Menjivar MD at [address] [place] [address] OTHER SURGICAL      colonoscopy    [place] UNLISTED [address] Personal factors and/or comorbidities impacting plan of care: as above    Home Situation  Home Environment: Private residence  Living Alone: No  Support Systems: Child(nancy)    EXAMINATION/PRESENTATION/DECISION MAKING:   Critical Behavior:  Neurologic State: Drowsy  Orientation Level: Disoriented to time, Disoriented to situation, Oriented to person, Oriented to place  Cognition: Decreased command following, Decreased attention/concentration  Safety/Judgement: Decreased insight into deficits, Decreased awareness of environment, Decreased awareness of need for safety  Hearing: Auditory  Auditory Impairment: None  Skin:  LE exposed skin intact  Edema: 2+ distal LEs  Range Of Motion:              PROM: Generally decreased, functional           Strength:    Strength: Generally decreased, functional                    Tone & Sensation:   Tone: Normal                              Coordination:  Coordination: Generally decreased, functional       Functional Mobility:  Bed Mobility:     Supine to Sit: Additional time;Minimum assistance; Adaptive equipment;Bed Modified     Scooting: Additional time;Contact guard assistance  Transfers:  Sit to Stand: Assist x2;Minimum assistance; Moderate assistance  Stand to Sit: Assist x2; Moderate assistance;Minimum assistance                       Balance:   Sitting: Without support  Sitting - Static: Fair (occasional)  Sitting - Dynamic: Fair (occasional)  Standing: Without support  Standing - Static: Fair  Standing - Dynamic : Fair  Ambulation/Gait Training:  Distance (ft):  (10 + 20)  Assistive Device: Walker, rolling;Gait belt (hand held assist)  Ambulation - Level of Assistance: Minimal assistance;Assist x2; Additional time        Gait Abnormalities: Decreased step clearance; Festinating gait        Base of Support: Widened     Speed/Steph: Pace decreased (<100 feet/min); Shuffled                     Max cues and constant assist for RW management.                  Therapeutic Exercises: Ankle pumps x 10    Functional Measure:  Tinetti test:    Sitting Balance: 0  Arises: 0  Attempts to Rise: 0  Immediate Standing Balance: 0  Standing Balance: 0  Nudged: 0  Eyes Closed: 0  Turn 360 Degrees - Continuous/Discontinuous: 0  Turn 360 Degrees - Steady/Unsteady: 0  Sitting Down: 0  Balance Score: 0 Balance total score  Indication of Gait: 0  R Step Length/Height: 0  L Step Length/Height: 0  R Foot Clearance: 0  L Foot Clearance: 0  Step Symmetry: 1  Step Continuity: 0  Path: 0  Trunk: 0  Walking Time: 0  Gait Score: 1 Gait total score  Total Score: 1/28 Overall total score         Tinetti Tool Score Risk of Falls  <19 = High Fall Risk  19-24 = Moderate Fall Risk  25-28 = Low Fall Risk  Tinetti ME. Performance-Oriented Assessment of Mobility Problems in Elderly Patients. [place] 66; [id number].  (Scoring Description: PT Bulletin Feb. 10, 1993)    Older adults: Naresh Banegas et al, 2009; n = 1000 [facility] elderly evaluated with ABC, HENRY, ADL, and IADL)  · Mean HENRY score for males aged 69-68 years = 26.21(3.40)  · Mean HENRY score for females age 69-68 years = 25.16(4.30)  · Mean HENRY score for males over 80 years = 23.29(6.02)  · Mean HENRY score for females over 80 years = 17.20(8.32)            Physical Therapy Evaluation Charge Determination   History Examination Presentation Decision-Making   HIGH Complexity :3+ comorbidities / personal factors will impact the outcome/ POC  HIGH Complexity : 4+ Standardized tests and measures addressing body structure, function, activity limitation and / or participation in recreation  MEDIUM Complexity : Evolving with changing characteristics  Other outcome measures barthel  MEDIUM      Based on the above components, the patient evaluation is determined to be of the following complexity level: MEDIUM    Pain Rating:  No pain complaints    Activity Tolerance:   requires rest breaks    After treatment patient left in no apparent distress:   Sitting in chair, Call bell within reach, and Bed / chair alarm activated; 1:1 sitter at bedside      COMMUNICATION/EDUCATION:   The patients plan of care was discussed with: Occupational therapist and Registered nurse. Fall prevention education was provided and the patient/caregiver indicated understanding., Patient/family have participated as able in goal setting and plan of care. , and Patient/family agree to work toward stated goals and plan of care.     Thank you for this referral.  Irena Hill, PT, DPT   Time Calculation: 32 mins

## 2023-01-30 NOTE — PROGRESS NOTES
Problem: Pressure Injury - Risk of  Goal: *Prevention of pressure injury  Description: Document Kg Scale and appropriate interventions in the flowsheet. Outcome: Progressing Towards Goal  Note: Pressure Injury Interventions:  Sensory Interventions: Assess changes in LOC, Check visual cues for pain, Discuss PT/OT consult with provider, Float heels, Keep linens dry and wrinkle-free, Maintain/enhance activity level, Minimize linen layers, Monitor skin under medical devices    Moisture Interventions: Absorbent underpads, Limit adult briefs, Maintain skin hydration (lotion/cream), Internal/External urinary devices    Activity Interventions: Pressure redistribution bed/mattress(bed type), PT/OT evaluation    Mobility Interventions: PT/OT evaluation, Pressure redistribution bed/mattress (bed type), Turn and reposition approx. every two hours(pillow and wedges)    Nutrition Interventions: Discuss nutritional consult with provider    Friction and Shear Interventions: Minimize layers                Problem: Patient Education: Go to Patient Education Activity  Goal: Patient/Family Education  Outcome: Progressing Towards Goal     Problem: Airway Clearance - Ineffective  Goal: Achieve or maintain patent airway  Outcome: Progressing Towards Goal     Problem: Gas Exchange - Impaired  Goal: Absence of hypoxia  Outcome: Progressing Towards Goal  Goal: Promote optimal lung function  Outcome: Progressing Towards Goal     Problem: Breathing Pattern - Ineffective  Goal: Ability to achieve and maintain a regular respiratory rate  Outcome: Progressing Towards Goal     Problem:  Body Temperature -  Risk of, Imbalanced  Goal: Ability to maintain a body temperature within defined limits  Outcome: Progressing Towards Goal  Goal: Will regain or maintain usual level of consciousness  Outcome: Progressing Towards Goal  Goal: Complications related to the disease process, condition or treatment will be avoided or minimized  Outcome: Progressing Towards Goal     Problem: Isolation Precautions - Risk of Spread of Infection  Goal: Prevent transmission of infectious organism to others  Outcome: Progressing Towards Goal     Problem: Nutrition Deficits  Goal: Optimize nutrtional status  Outcome: Progressing Towards Goal     Problem: Risk for Fluid Volume Deficit  Goal: Maintain normal heart rhythm  Outcome: Progressing Towards Goal  Goal: Maintain absence of muscle cramping  Outcome: Progressing Towards Goal  Goal: Maintain normal serum potassium, sodium, calcium, phosphorus, and pH  Outcome: Progressing Towards Goal     Problem: Loneliness or Risk for Loneliness  Goal: Demonstrate positive use of time alone when socialization is not possible  Outcome: Progressing Towards Goal     Problem: Fatigue  Goal: Verbalize increase energy and improved vitality  Outcome: Progressing Towards Goal     Problem: Patient Education: Go to Patient Education Activity  Goal: Patient/Family Education  Outcome: Progressing Towards Goal     Problem: Pain  Goal: *Control of Pain  Outcome: Progressing Towards Goal  Goal: *PALLIATIVE CARE:  Alleviation of Pain  Outcome: Progressing Towards Goal     Problem: Patient Education: Go to Patient Education Activity  Goal: Patient/Family Education  Outcome: Progressing Towards Goal     Problem: General Medical Care Plan  Goal: *Vital signs within specified parameters  Outcome: Progressing Towards Goal  Goal: *Labs within defined limits  Outcome: Progressing Towards Goal  Goal: *Absence of infection signs and symptoms  Outcome: Progressing Towards Goal  Goal: *Optimal pain control at patient's stated goal  Outcome: Progressing Towards Goal  Goal: *Skin integrity maintained  Outcome: Progressing Towards Goal  Goal: *Fluid volume balance  Outcome: Progressing Towards Goal  Goal: *Optimize nutritional status  Outcome: Progressing Towards Goal  Goal: *Anxiety reduced or absent  Outcome: Progressing Towards Goal  Goal: *Progressive mobility and function (eg: ADL's)  Outcome: Progressing Towards Goal     Problem: Patient Education: Go to Patient Education Activity  Goal: Patient/Family Education  Outcome: Progressing Towards Goal     Problem: Falls - Risk of  Goal: *Absence of Falls  Description: Document Cedrick Colder Fall Risk and appropriate interventions in the flowsheet.   Outcome: Progressing Towards Goal     Problem: Patient Education: Go to Patient Education Activity  Goal: Patient/Family Education  Outcome: Progressing Towards Goal     Problem: Delirium Treatment  Goal: *Level of consciousness restored to baseline  Outcome: Progressing Towards Goal  Goal: *Level of environmental perceptions restored to baseline  Outcome: Progressing Towards Goal  Goal: *Sensory perception restored to baseline  Outcome: Progressing Towards Goal  Goal: *Emotional stability restored to baseline  Outcome: Progressing Towards Goal  Goal: *Functional assessment restored to baseline  Outcome: Progressing Towards Goal  Goal: *Absence of falls  Outcome: Progressing Towards Goal  Goal: *Will remain free of delirium, CAM Score negative  Outcome: Progressing Towards Goal  Goal: *Cognitive status will be restored to baseline  Outcome: Progressing Towards Goal  Goal: Interventions  Outcome: Progressing Towards Goal     Problem: Patient Education: Go to Patient Education Activity  Goal: Patient/Family Education  Outcome: Progressing Towards Goal

## 2023-01-30 NOTE — PROGRESS NOTES
Problem: Self Care Deficits Care Plan (Adult)  Goal: *Acute Goals and Plan of Care (Insert Text)  Description: FUNCTIONAL STATUS PRIOR TO ADMISSION: Patient was independent and active without use of DME. Patient was independent for basic and instrumental ADLs. Pt drives and participates in IADLs. HOME SUPPORT: Pt and wife live with daughter. Occupational Therapy Goals  Initiated 1/30/2023  1. Patient will perform grooming, standing at sink, with supervision/set-up within 7 day(s). 2.  Patient will perform lower body dressing with modified independence within 7 day(s). 3.  Patient will perform bathing with supervision/set-up within 7 day(s). 4.  Patient will perform toilet transfers with modified independence within 7 day(s). 5.  Patient will perform all aspects of toileting with modified independence within 7 day(s). 6.  Patient will participate in upper extremity therapeutic exercise/activities with independence for 10 minutes within 7 day(s). 7.  Patient will utilize energy conservation techniques during functional activities with verbal cues within 7 day(s). Outcome: Progressing Towards Goal     OCCUPATIONAL THERAPY EVALUATION  Patient: Cynthia Nichols (60 y.o. male)  Date: 1/30/2023  Primary Diagnosis: COVID [U07.1]       Precautions:  Fall, Skin (droplet +)    ASSESSMENT  Based on the objective data described below, the patient presents with impaired standing balance, generally decreased strength, decreased endurance, confusion and disorientation, limited mobility, and decreased independence and safety with ADLs following admission for GW and fatigue, found to have +COVID-19 infection and COPD exacerbation. Pt today is oriented to self and place only and demonstrates poor insight into his deficits. He requires cues for safety throughout all activity as well as repetition of cues for some tasks/transfers.  Pt mobilizes to bathroom for ADLs and is able to participate in micky area hygiene, LB bathing, and dressing with up to max A. Pt with poor use of RW and requires increased assistance for stability during standing portions of ADL tasks with observed posterior leaning/LOB. Pt desaturates to 86% on RA and is replaced with nasal cannula at 2L as found at end of session with sitter present. At this time, would recommend SNF-level rehab. Current Level of Function Impacting Discharge (ADLs/self-care): set up to min A for UB ADLs; mod to max A for LB ADLs; overall min A for ADL transfers    Functional Outcome Measure: The patient scored Total: 45/100 on the Barthel Index outcome measure which is indicative of being moderately impaired in basic self-care. Other factors to consider for discharge: high fall risk; cognition; independent PLOF     Patient will benefit from skilled therapy intervention to address the above noted impairments. PLAN :  Recommendations and Planned Interventions: self care training, functional mobility training, therapeutic exercise, balance training, therapeutic activities, cognitive retraining, endurance activities, patient education, home safety training, and family training/education    Frequency/Duration: Patient will be followed by occupational therapy 5 times a week to address goals. Recommendation for discharge: (in order for the patient to meet his/her long term goals)  Therapy up to 5 days/week in SNF setting    This discharge recommendation:  Has been made in collaboration with the attending provider and/or case management    IF patient discharges home will need the following DME: TBD       SUBJECTIVE:   Patient stated Let me try it myself.     OBJECTIVE DATA SUMMARY:   HISTORY:   Past Medical History:   Diagnosis Date    CKD (chronic kidney disease) stage 3, GFR 30-59 ml/min ([facility] [address] 10/03/2014    Other ill-defined conditions(799.89)     recent cold, taking benadryl for sinus congestion    Tobacco abuse      Past Surgical History:   Procedure Laterality Date    COLONOSCOPY N/A 11/16/2021    COLONOSCOPY performed by Fco Lieberman MD at [address]      HX OTHER SURGICAL      colonoscopy    [place] UNLISTED [address]         Expanded or extensive additional review of patient history:     Home Situation  Home Environment: Private residence  # Steps to Enter: 0  One/Two Story Residence: One story  Living Alone: No  Support Systems: Child(nancy)  Patient Expects to be Discharged to[de-identified] Home with home health  Current DME Used/Available at Home: None    Hand dominance: Right    EXAMINATION OF PERFORMANCE DEFICITS:  Cognitive/Behavioral Status:  Neurologic State: Drowsy  Orientation Level: Disoriented to time;Disoriented to situation;Oriented to person;Oriented to place  Cognition: Decreased command following;Decreased attention/concentration  Perception: Appears intact  Perseveration: No perseveration noted  Safety/Judgement: Decreased insight into deficits; Decreased awareness of environment;Decreased awareness of need for safety    Hearing: Auditory  Auditory Impairment: None    Range of Motion:  PROM: Generally decreased, functional    Strength:  Strength: Generally decreased, functional    Coordination:  Coordination: Generally decreased, functional  Fine Motor Skills-Upper: Left Intact; Right Intact    Gross Motor Skills-Upper: Left Intact; Right Intact    Tone:  Tone: Normal    Balance:  Sitting: Without support  Sitting - Static: Fair (occasional)  Sitting - Dynamic: Fair (occasional)  Standing: Without support  Standing - Static: Fair  Standing - Dynamic : Fair    Functional Mobility and Transfers for ADLs:  Bed Mobility:  Supine to Sit: Additional time;Minimum assistance; Adaptive equipment;Bed Modified  Scooting: Additional time;Contact guard assistance    Transfers:  Sit to Stand: Assist x2;Minimum assistance; Moderate assistance  Stand to Sit: Assist x2; Moderate assistance;Minimum assistance  Bathroom Mobility: Minimum assistance  Toilet Transfer : Minimum assistance; Moderate assistance    ADL Assessment:  Feeding: Setup    Oral Facial Hygiene/Grooming: Setup;Supervision (seated)    Bathing: Moderate assistance    Upper Body Dressing: Minimum assistance    Lower Body Dressing: Moderate assistance    Toileting: Moderate assistance    ADL Intervention and task modifications:  Grooming  Grooming Assistance: Set-up; Supervision  Position Performed: Seated in chair  Washing Hands: Set-up; Supervision    Lower Body Dressing Assistance  Socks: Maximum assistance  Leg Crossed Method Used: No  Position Performed: Bending forward method;Seated in chair  Cues: Don;Doff;Physical assistance; Tactile cues provided;Verbal cues provided;Visual cues provided    Toileting  Bladder Hygiene: Stand-by assistance;Minimum assistance (seated on toilet)  Clothing Management: Moderate assistance  Cues: Physical assistance for pants down;Physical assistance for pants up; Tactile cues provided;Verbal cues provided;Visual cues provided  Adaptive Equipment: Grab bars; Walker    Cognitive Retraining  Safety/Judgement: Decreased insight into deficits; Decreased awareness of environment;Decreased awareness of need for safety    Functional Measure:    Barthel Index:  Bathin  Bladder: 5  Bowels: 5  Groomin  Dressin  Feeding: 10  Mobility: 0  Stairs: 0  Toilet Use: 5  Transfer (Bed to Chair and Back): 10  Total: 45/100      The Barthel ADL Index: Guidelines  1. The index should be used as a record of what a patient does, not as a record of what a patient could do. 2. The main aim is to establish degree of independence from any help, physical or verbal, however minor and for whatever reason. 3. The need for supervision renders the patient not independent. 4. A patient's performance should be established using the best available evidence.  Asking the patient, friends/relatives and nurses are the usual sources, but direct observation and common sense are also important. However direct testing is not needed. 5. Usually the patient's performance over the preceding 24-48 hours is important, but occasionally longer periods will be relevant. 6. Middle categories imply that the patient supplies over 50 per cent of the effort. 7. Use of aids to be independent is allowed. Score Interpretation (from [address] [facility] 83)    Independent   60-79 Minimally independent   40-59 Partially dependent   20-39 Very dependent   <20 Totally dependent     -Zen Huizar., Barthel, DGiselleW. (1965). Functional evaluation: the Barthel Index. [address] ([address] (). The Barthel activities of daily living index: self-reporting versus actual performance in the old (> or = 75 years). Journal [address] 45(7), [address] [facility], TK, Jeffrey Moody., [facility]. (). Measuring the change in disability after inpatient rehabilitation; comparison of the responsiveness of the Barthel Index and Functional [place] Measure. Journal of Neurology, Neurosurgery, and Psychiatry, 66(4), 304-842. Terri Stein, N.J.A, HARI Peña, & Deb Ybarra, MGiselleA. (2004) Assessment of post-stroke quality of life in cost-effectiveness studies: The usefulness of the Barthel Index and the EuroQoL-5D. Quality of Life Research, 15, 342-92     Occupational Therapy Evaluation Charge Determination   History Examination Decision-Making   LOW Complexity : Brief history review  HIGH Complexity : 5 or more performance deficits relating to physical, cognitive , or psychosocial skils that result in activity limitations and / or participation restrictions MEDIUM Complexity : Patient may present with comorbidities that affect occupational performnce.  Miniml to moderate modification of tasks or assistance (eg, physical or verbal ) with assesment(s) is necessary to enable patient to complete evaluation       Based on the above components, the patient evaluation is determined to be of the following complexity level: LOW   Pain Rating:  Pt reporting minimal pain    Activity Tolerance:   Fair, desaturates with exertion and requires oxygen, and requires rest breaks    After treatment patient left in no apparent distress:    Sitting in chair, Call bell within reach, Bed / chair alarm activated, and sitter present    COMMUNICATION/EDUCATION:   The patients plan of care was discussed with: Physical therapist and Registered nurse. Home safety education was provided and the patient/caregiver indicated understanding., Patient/family have participated as able in goal setting and plan of care. , and Patient/family agree to work toward stated goals and plan of care. This patients plan of care is appropriate for delegation to [facility].     Thank you for this referral.  Shelby Day OT  Time Calculation: 38 mins

## 2023-01-30 NOTE — ED NOTES
Pt.attempting to get OOB to urinate. Pulled off purewick. Daughter is outside of room talking with physician about possible admit. Pt.assisted back to bed. Side rails remain up x 2. Bed alarm on.

## 2023-01-30 NOTE — PROGRESS NOTES
Problem: Pressure Injury - Risk of  Goal: *Prevention of pressure injury  Description: Document Kg Scale and appropriate interventions in the flowsheet. Outcome: Progressing Towards Goal  Note: Pressure Injury Interventions:       Moisture Interventions: Internal/External urinary devices    Activity Interventions: Increase time out of bed    Mobility Interventions: HOB 30 degrees or less    Nutrition Interventions: Offer support with meals,snacks and hydration                     Problem: Patient Education: Go to Patient Education Activity  Goal: Patient/Family Education  Outcome: Progressing Towards Goal     Problem: Airway Clearance - Ineffective  Goal: Achieve or maintain patent airway  Outcome: Progressing Towards Goal     Problem: Gas Exchange - Impaired  Goal: Absence of hypoxia  Outcome: Progressing Towards Goal  Goal: Promote optimal lung function  Outcome: Progressing Towards Goal     Problem: Breathing Pattern - Ineffective  Goal: Ability to achieve and maintain a regular respiratory rate  Outcome: Progressing Towards Goal     Problem:  Body Temperature -  Risk of, Imbalanced  Goal: Ability to maintain a body temperature within defined limits  Outcome: Progressing Towards Goal  Goal: Will regain or maintain usual level of consciousness  Outcome: Progressing Towards Goal  Goal: Complications related to the disease process, condition or treatment will be avoided or minimized  Outcome: Progressing Towards Goal     Problem: Isolation Precautions - Risk of Spread of Infection  Goal: Prevent transmission of infectious organism to others  Outcome: Progressing Towards Goal     Problem: Nutrition Deficits  Goal: Optimize nutrtional status  Outcome: Progressing Towards Goal     Problem: Risk for Fluid Volume Deficit  Goal: Maintain normal heart rhythm  Outcome: Progressing Towards Goal  Goal: Maintain absence of muscle cramping  Outcome: Progressing Towards Goal  Goal: Maintain normal serum potassium, sodium, calcium, phosphorus, and pH  Outcome: Progressing Towards Goal     Problem: Loneliness or Risk for Loneliness  Goal: Demonstrate positive use of time alone when socialization is not possible  Outcome: Progressing Towards Goal     Problem: Fatigue  Goal: Verbalize increase energy and improved vitality  Outcome: Progressing Towards Goal     Problem: Patient Education: Go to Patient Education Activity  Goal: Patient/Family Education  Outcome: Progressing Towards Goal     Problem: Pain  Goal: *Control of Pain  Outcome: Progressing Towards Goal  Goal: *PALLIATIVE CARE:  Alleviation of Pain  Outcome: Progressing Towards Goal     Problem: Patient Education: Go to Patient Education Activity  Goal: Patient/Family Education  Outcome: Progressing Towards Goal     Problem: General Medical Care Plan  Goal: *Vital signs within specified parameters  Outcome: Progressing Towards Goal  Goal: *Labs within defined limits  Outcome: Progressing Towards Goal  Goal: *Absence of infection signs and symptoms  Outcome: Progressing Towards Goal  Goal: *Optimal pain control at patient's stated goal  Outcome: Progressing Towards Goal  Goal: *Skin integrity maintained  Outcome: Progressing Towards Goal  Goal: *Fluid volume balance  Outcome: Progressing Towards Goal  Goal: *Optimize nutritional status  Outcome: Progressing Towards Goal  Goal: *Anxiety reduced or absent  Outcome: Progressing Towards Goal  Goal: *Progressive mobility and function (eg: ADL's)  Outcome: Progressing Towards Goal     Problem: Patient Education: Go to Patient Education Activity  Goal: Patient/Family Education  Outcome: Progressing Towards Goal     Problem: Falls - Risk of  Goal: *Absence of Falls  Description: Document [place] Fall Risk and appropriate interventions in the flowsheet.   Outcome: Progressing Towards Goal  Note: Fall Risk Interventions:                                Problem: Patient Education: Go to Patient Education Activity  Goal: Patient/Family Education  Outcome: Progressing Towards Goal

## 2023-01-30 NOTE — ED NOTES
Pt.daughter is at bedside. Reports father was living in house with mother up until about 3 weeks ago. Daughter states she moved mother and father into her house about 3 weeks ago due to fathers frequent falls. (daughter states neighbors were also telling her about father falling outside). Daughter also states that father has been very unkempt which is unusual for him. States she took him to PCP in December and he had lab work drawn. PCP gave follow up instructions to see foot specialist because patient told dPCP he was falling because of the arches in his feet. Daughter also states that mother does currently have covid and she is not wearing mask in the house. . .states it was unusual for father to be incontinent of urine. Daughter does verify that father is currently on no medication at home.

## 2023-01-30 NOTE — PROGRESS NOTES
Physical Therapy Note:    PT evaluation deferred. Pt with orders for LE dopplers.     Jeremías Goo, PT, DPT, Ivory Giang

## 2023-01-30 NOTE — PROGRESS NOTES
Pt has intermittent confusion. Slow to respond and refuses to answer questions. Says \"yeah and Uh\" to questions.  Unable to complete admission docs at this time, will complete when daughter arrives in the morning

## 2023-01-31 ENCOUNTER — APPOINTMENT (OUTPATIENT)
Dept: GENERAL RADIOLOGY | Age: 83
DRG: 179 | End: 2023-01-31
Attending: INTERNAL MEDICINE
Payer: MEDICARE

## 2023-01-31 LAB
ANION GAP SERPL CALC-SCNC: 7 MMOL/L (ref 5–15)
BASOPHILS # BLD: 0 K/UL (ref 0–0.1)
BASOPHILS NFR BLD: 0 % (ref 0–1)
BUN SERPL-MCNC: 18 MG/DL (ref 6–20)
BUN/CREAT SERPL: 14 (ref 12–20)
CALCIUM SERPL-MCNC: 9.1 MG/DL (ref 8.5–10.1)
CHLORIDE SERPL-SCNC: 109 MMOL/L (ref 97–108)
CO2 SERPL-SCNC: 27 MMOL/L (ref 21–32)
CREAT SERPL-MCNC: 1.27 MG/DL (ref 0.7–1.3)
CRP SERPL HS-MCNC: >9.5 MG/L
DIFFERENTIAL METHOD BLD: ABNORMAL
EOSINOPHIL # BLD: 0 K/UL (ref 0–0.4)
EOSINOPHIL NFR BLD: 0 % (ref 0–7)
ERYTHROCYTE [DISTWIDTH] IN BLOOD BY AUTOMATED COUNT: 14.3 % (ref 11.5–14.5)
FERRITIN SERPL-MCNC: 128 NG/ML (ref 26–388)
GLUCOSE SERPL-MCNC: 126 MG/DL (ref 65–100)
HCT VFR BLD AUTO: 34.3 % (ref 36.6–50.3)
HGB BLD-MCNC: 11.7 G/DL (ref 12.1–17)
IMM GRANULOCYTES # BLD AUTO: 0 K/UL (ref 0–0.04)
IMM GRANULOCYTES NFR BLD AUTO: 0 % (ref 0–0.5)
LYMPHOCYTES # BLD: 1.1 K/UL (ref 0.8–3.5)
LYMPHOCYTES NFR BLD: 18 % (ref 12–49)
MCH RBC QN AUTO: 33.5 PG (ref 26–34)
MCHC RBC AUTO-ENTMCNC: 34.1 G/DL (ref 30–36.5)
MCV RBC AUTO: 98.3 FL (ref 80–99)
MONOCYTES # BLD: 0.6 K/UL (ref 0–1)
MONOCYTES NFR BLD: 10 % (ref 5–13)
NEUTS SEG # BLD: 4.2 K/UL (ref 1.8–8)
NEUTS SEG NFR BLD: 72 % (ref 32–75)
NRBC # BLD: 0 K/UL (ref 0–0.01)
NRBC BLD-RTO: 0 PER 100 WBC
PLATELET # BLD AUTO: 151 K/UL (ref 150–400)
PMV BLD AUTO: 11.7 FL (ref 8.9–12.9)
POTASSIUM SERPL-SCNC: 4 MMOL/L (ref 3.5–5.1)
PROCALCITONIN SERPL-MCNC: <0.05 NG/ML
RBC # BLD AUTO: 3.49 M/UL (ref 4.1–5.7)
SODIUM SERPL-SCNC: 143 MMOL/L (ref 136–145)
WBC # BLD AUTO: 6 K/UL (ref 4.1–11.1)

## 2023-01-31 PROCEDURE — 82728 ASSAY OF FERRITIN: CPT

## 2023-01-31 PROCEDURE — 84145 PROCALCITONIN (PCT): CPT

## 2023-01-31 PROCEDURE — 74011250637 HC RX REV CODE- 250/637: Performed by: HOSPITALIST

## 2023-01-31 PROCEDURE — 97535 SELF CARE MNGMENT TRAINING: CPT

## 2023-01-31 PROCEDURE — 2709999900 HC NON-CHARGEABLE SUPPLY

## 2023-01-31 PROCEDURE — 80048 BASIC METABOLIC PNL TOTAL CA: CPT

## 2023-01-31 PROCEDURE — 71045 X-RAY EXAM CHEST 1 VIEW: CPT

## 2023-01-31 PROCEDURE — 94761 N-INVAS EAR/PLS OXIMETRY MLT: CPT

## 2023-01-31 PROCEDURE — 94664 DEMO&/EVAL PT USE INHALER: CPT

## 2023-01-31 PROCEDURE — 36415 COLL VENOUS BLD VENIPUNCTURE: CPT

## 2023-01-31 PROCEDURE — 74011000250 HC RX REV CODE- 250: Performed by: INTERNAL MEDICINE

## 2023-01-31 PROCEDURE — 74011250636 HC RX REV CODE- 250/636: Performed by: INTERNAL MEDICINE

## 2023-01-31 PROCEDURE — 65270000029 HC RM PRIVATE

## 2023-01-31 PROCEDURE — 86141 C-REACTIVE PROTEIN HS: CPT

## 2023-01-31 PROCEDURE — 94640 AIRWAY INHALATION TREATMENT: CPT

## 2023-01-31 PROCEDURE — 85025 COMPLETE CBC W/AUTO DIFF WBC: CPT

## 2023-01-31 PROCEDURE — 97530 THERAPEUTIC ACTIVITIES: CPT

## 2023-01-31 PROCEDURE — 74011250637 HC RX REV CODE- 250/637: Performed by: INTERNAL MEDICINE

## 2023-01-31 PROCEDURE — 97116 GAIT TRAINING THERAPY: CPT

## 2023-01-31 RX ADMIN — TIOTROPIUM BROMIDE INHALATION SPRAY 2 PUFF: 3.12 SPRAY, METERED RESPIRATORY (INHALATION) at 07:44

## 2023-01-31 RX ADMIN — ALBUTEROL SULFATE 2 PUFF: 90 AEROSOL, METERED RESPIRATORY (INHALATION) at 13:50

## 2023-01-31 RX ADMIN — SODIUM CHLORIDE, PRESERVATIVE FREE 10 ML: 5 INJECTION INTRAVENOUS at 22:44

## 2023-01-31 RX ADMIN — SODIUM CHLORIDE, PRESERVATIVE FREE 10 ML: 5 INJECTION INTRAVENOUS at 14:50

## 2023-01-31 RX ADMIN — GUAIFENESIN 600 MG: 600 TABLET ORAL at 20:50

## 2023-01-31 RX ADMIN — SODIUM CHLORIDE, PRESERVATIVE FREE 10 ML: 5 INJECTION INTRAVENOUS at 05:01

## 2023-01-31 RX ADMIN — GUAIFENESIN 600 MG: 600 TABLET ORAL at 08:34

## 2023-01-31 RX ADMIN — ALBUTEROL SULFATE 2 PUFF: 90 AEROSOL, METERED RESPIRATORY (INHALATION) at 01:10

## 2023-01-31 RX ADMIN — ALBUTEROL SULFATE 2 PUFF: 90 AEROSOL, METERED RESPIRATORY (INHALATION) at 20:45

## 2023-01-31 RX ADMIN — DEXAMETHASONE 6 MG: 6 TABLET ORAL at 08:34

## 2023-01-31 RX ADMIN — ALBUTEROL SULFATE 2 PUFF: 90 AEROSOL, METERED RESPIRATORY (INHALATION) at 07:33

## 2023-01-31 RX ADMIN — ENOXAPARIN SODIUM 40 MG: 100 INJECTION SUBCUTANEOUS at 08:34

## 2023-01-31 NOTE — PROGRESS NOTES
Problem: Mobility Impaired (Adult and Pediatric)  Goal: *Acute Goals and Plan of Care (Insert Text)  Description: FUNCTIONAL STATUS PRIOR TO ADMISSION: Pt unable to provide baseline/history at evaluation due to cognitive status. He reports independent baseline functional mobility at community level including driving. HOME SUPPORT PRIOR TO ADMISSION: Per chart pt recently moved to daughter's home. Physical Therapy Goals  Initiated 1/30/2023  1. Patient will move from supine to sit and sit to supine  in bed with supervision/set-up within 7 day(s). 2.  Patient will transfer from bed to chair and chair to bed with supervision/set-up using the least restrictive device within 7 day(s). 3.  Patient will perform sit to stand with supervision/set-up within 7 day(s). 4.  Patient will ambulate with supervision/set-up for 150 feet with the least restrictive device within 7 day(s). 5.  Patient will ascend/descend 4 stairs with one handrail(s) with supervision/set-up within 7 day(s). Outcome: Progressing Towards Goal   PHYSICAL THERAPY TREATMENT  Patient: Rika Tan (45 y.o. male)  Date: 1/31/2023  Diagnosis: COVID [U07.1] <principal problem not specified>      Precautions: Fall, Skin (droplet +)  Chart, physical therapy assessment, plan of care and goals were reviewed. ASSESSMENT  Patient continues with skilled PT services and is progressing towards goals. Communicated with nurse cleared for therapy. Patient attempting to get out of bed when received, instructed patient to wait need to gown up. Rolled on the edge of bed supervision, supine to sit supervision, sit to stand SBA, ambulate in the room and around the bed assisted to and from the bathroom. Ambulate multiple time around the bed inside covid room. OOB to chair as tolerated performed some active range of motion exercise on both LE while up on the chair.  Educate and instructed patient to continue active range of motion exercise on both legs while up on chair or on bed multiple times. Recommend patient to be up on the chair at least 3 times a day every meal times as tolerated. Activated chair alarm and notified nurse who agreed to monitor patient. Current Level of Function Impacting Discharge (mobility/balance): SBA with transfers and ambulation with and without rolling walker. Other factors to consider for discharge: fall         PLAN :  Patient continues to benefit from skilled intervention to address the above impairments. Continue treatment per established plan of care. to address goals. Recommendation for discharge: (in order for the patient to meet his/her long term goals)  Therapy up to 5 days/week in SNF setting    This discharge recommendation:  Has been made in collaboration with the attending provider and/or case management    IF patient discharges home will need the following DME: patient owns DME required for discharge       SUBJECTIVE:   Patient stated I need to go to the bathroom.     OBJECTIVE DATA SUMMARY:   Critical Behavior:  Neurologic State: Alert  Orientation Level: Oriented to person  Cognition: Poor safety awareness, Follows commands  Safety/Judgement: Decreased insight into deficits, Decreased awareness of environment, Decreased awareness of need for safety  Functional Mobility Training:  Bed Mobility:  Rolling: Supervision  Supine to Sit: Supervision  Sit to Supine: Supervision  Scooting: Supervision        Transfers:  Sit to Stand: Stand-by assistance  Stand to Sit: Stand-by assistance  Stand Pivot Transfers: Stand-by assistance     Bed to Chair: Stand-by assistance                    Balance:  Sitting: Intact; High guard  Sitting - Static: Good (unsupported)  Sitting - Dynamic: Good (unsupported)  Standing: Impaired; Without support  Standing - Static: Good  Standing - Dynamic : Fair  Ambulation/Gait Training:  Distance (ft): 50 Feet (ft)  Assistive Device: Gait belt  Ambulation - Level of Assistance: Stand-by assistance     Gait Description (WDL): Exceptions to WDL  Gait Abnormalities: Path deviations        Base of Support: Widened     Speed/Steph: Pace decreased (<100 feet/min)  Step Length: Right shortened;Left shortened                            Therapeutic Exercises:   Educate and instructed patient to continue active range of motion exercise on both legs while up on chair or on bed multiple times. Recommend patient to be up on the chair at least 3 times a day every meal times as tolerated. Pain Ratin/10    Activity Tolerance:   Good    After treatment patient left in no apparent distress:   Sitting in chair, Heels elevated for pressure relief, Call bell within reach, and Bed / chair alarm activated    COMMUNICATION/COLLABORATION:   The patients plan of care was discussed with: Occupational therapist, Registered nurse, and Case management. Hortencia Cam, PT,WCC.    Time Calculation: 25 mins

## 2023-01-31 NOTE — PROGRESS NOTES
Problem: Pressure Injury - Risk of  Goal: *Prevention of pressure injury  Description: Document Kg Scale and appropriate interventions in the flowsheet. Outcome: Progressing Towards Goal  Note: Pressure Injury Interventions:  Sensory Interventions: Avoid rigorous massage over bony prominences, Check visual cues for pain, Discuss PT/OT consult with provider, Float heels, Keep linens dry and wrinkle-free, Maintain/enhance activity level, Minimize linen layers, Monitor skin under medical devices    Moisture Interventions: Absorbent underpads, Apply protective barrier, creams and emollients, Assess need for specialty bed, Limit adult briefs, Maintain skin hydration (lotion/cream), Moisture barrier, Minimize layers    Activity Interventions: Increase time out of bed, Pressure redistribution bed/mattress(bed type), PT/OT evaluation    Mobility Interventions: Float heels, PT/OT evaluation    Nutrition Interventions: Document food/fluid/supplement intake    Friction and Shear Interventions: Minimize layers                Problem: Patient Education: Go to Patient Education Activity  Goal: Patient/Family Education  Outcome: Progressing Towards Goal     Problem: Airway Clearance - Ineffective  Goal: Achieve or maintain patent airway  Outcome: Progressing Towards Goal     Problem: Gas Exchange - Impaired  Goal: Absence of hypoxia  Outcome: Progressing Towards Goal  Goal: Promote optimal lung function  Outcome: Progressing Towards Goal     Problem: Breathing Pattern - Ineffective  Goal: Ability to achieve and maintain a regular respiratory rate  Outcome: Progressing Towards Goal     Problem:  Body Temperature -  Risk of, Imbalanced  Goal: Ability to maintain a body temperature within defined limits  Outcome: Progressing Towards Goal  Goal: Will regain or maintain usual level of consciousness  Outcome: Progressing Towards Goal  Goal: Complications related to the disease process, condition or treatment will be avoided or minimized  Outcome: Progressing Towards Goal     Problem: Isolation Precautions - Risk of Spread of Infection  Goal: Prevent transmission of infectious organism to others  Outcome: Progressing Towards Goal     Problem: Nutrition Deficits  Goal: Optimize nutrtional status  Outcome: Progressing Towards Goal     Problem: Risk for Fluid Volume Deficit  Goal: Maintain normal heart rhythm  Outcome: Progressing Towards Goal  Goal: Maintain absence of muscle cramping  Outcome: Progressing Towards Goal  Goal: Maintain normal serum potassium, sodium, calcium, phosphorus, and pH  Outcome: Progressing Towards Goal     Problem: Loneliness or Risk for Loneliness  Goal: Demonstrate positive use of time alone when socialization is not possible  Outcome: Progressing Towards Goal     Problem: Fatigue  Goal: Verbalize increase energy and improved vitality  Outcome: Progressing Towards Goal     Problem: Patient Education: Go to Patient Education Activity  Goal: Patient/Family Education  Outcome: Progressing Towards Goal     Problem: Pain  Goal: *Control of Pain  Outcome: Progressing Towards Goal  Goal: *PALLIATIVE CARE:  Alleviation of Pain  Outcome: Progressing Towards Goal     Problem: Patient Education: Go to Patient Education Activity  Goal: Patient/Family Education  Outcome: Progressing Towards Goal     Problem: General Medical Care Plan  Goal: *Vital signs within specified parameters  Outcome: Progressing Towards Goal  Goal: *Labs within defined limits  Outcome: Progressing Towards Goal  Goal: *Absence of infection signs and symptoms  Outcome: Progressing Towards Goal  Goal: *Optimal pain control at patient's stated goal  Outcome: Progressing Towards Goal  Goal: *Skin integrity maintained  Outcome: Progressing Towards Goal  Goal: *Fluid volume balance  Outcome: Progressing Towards Goal  Goal: *Optimize nutritional status  Outcome: Progressing Towards Goal  Goal: *Anxiety reduced or absent  Outcome: Progressing Towards Goal  Goal: *Progressive mobility and function (eg: ADL's)  Outcome: Progressing Towards Goal     Problem: Patient Education: Go to Patient Education Activity  Goal: Patient/Family Education  Outcome: Progressing Towards Goal     Problem: Falls - Risk of  Goal: *Absence of Falls  Description: Document Joyce Skill Fall Risk and appropriate interventions in the flowsheet.   Outcome: Progressing Towards Goal     Problem: Patient Education: Go to Patient Education Activity  Goal: Patient/Family Education  Outcome: Progressing Towards Goal     Problem: Delirium Treatment  Goal: *Level of consciousness restored to baseline  Outcome: Progressing Towards Goal  Goal: *Level of environmental perceptions restored to baseline  Outcome: Progressing Towards Goal  Goal: *Sensory perception restored to baseline  Outcome: Progressing Towards Goal  Goal: *Emotional stability restored to baseline  Outcome: Progressing Towards Goal  Goal: *Functional assessment restored to baseline  Outcome: Progressing Towards Goal  Goal: *Absence of falls  Outcome: Progressing Towards Goal  Goal: *Will remain free of delirium, CAM Score negative  Outcome: Progressing Towards Goal  Goal: *Cognitive status will be restored to baseline  Outcome: Progressing Towards Goal  Goal: Interventions  Outcome: Progressing Towards Goal     Problem: Patient Education: Go to Patient Education Activity  Goal: Patient/Family Education  Outcome: Progressing Towards Goal     Problem: Patient Education: Go to Patient Education Activity  Goal: Patient/Family Education  Outcome: Progressing Towards Goal     Problem: Patient Education: Go to Patient Education Activity  Goal: Patient/Family Education  Outcome: Progressing Towards Goal

## 2023-01-31 NOTE — PROGRESS NOTES
Reason for Admission:  COVID-19                    RUR Score: 9% LOW                    Plan for utilizing home health: Per recommendation pending pt's progression during hospitalization golden         PCP: First and Last name:  Shayy Elizabeth MD   Name of Practice:    Are you a current patient: Yes/No: Yes   Approximate date of last visit: Within the last 6 months    Can you participate in a virtual visit with your PCP: No                     Current Advanced Directive/Advance Care Plan: Full Code- no AMD- per attending pt is not oriented enough to complete them. Dtr advised to contact Dylan Roque. Healthcare Decision Maker:  Updated to reflect information obtained                  Transition of Care Plan:                     Pt is a [de-identified]year old,  [place] male, admitted with COVID-19. CM spoke with pt's dtr- Merlin Pitt ([phone])- confirming address, emergency contact and PCP. Pt is currently living with his wife and daughter in a home- has 3 steps to enter and 12 to get upstairs. Pt has no DME and dtr recently had him stop driving. [facility] (agency unknown) and has not been to any SNF/IPR in the past.     Pt's dtr confirmed insurance coverage of [facility] [employer] A[employer] (primary) and [facility] Medicaid (secondary)- states no problems affording or accessing medications. Pt will require [facility] transportation at time of discharge. Pt remains on Ortho- pending medical stability. CM spoke with pt's dtr regarding SNF placement- dtr understands limited options due to COVID-19 positive- FOC completed, referral sent via [employer] to [facility]- they accepted and can take pt. Per Babar Vaz at [facility] pt does not need auth as [facility] has a waiver right now. CM spoke with attending- pt may be able to d/c tomorrow- pt placed on [facility] will call. Pt's dtr notified (No answer, HIPPA compliant VM). Floor CM updated and will continue to follow and assist as needed.      Care Management Interventions  PCP Verified by CM: Yes  Mode of Transport at Discharge: BLS  Transition of Care Consult (CM Consult): Discharge Planning  MyChart Signup: No  Discharge Durable Medical Equipment: No  Health Maintenance Reviewed: Yes  Physical Therapy Consult: Yes  Occupational Therapy Consult: Yes  Speech Therapy Consult: No  Support Systems: Other Family Member(s), Spouse/Significant Other, Child(nancy)  Confirm Follow Up Transport: Family  The Patient and/or Patient Representative was Provided with a Choice of Provider and Agrees with the Discharge Plan?: Yes  Name of the Patient Representative Who was Provided with a Choice of Provider and Agrees with the Discharge Plan: Spoke with dtr  Freedom of Choice List was Provided with Basic Dialogue that Supports the Patient's Individualized Plan of Care/Goals, Treatment Preferences and Shares the Quality Data Associated with the Providers?: Yes  Discharge [facility]  [facility] [address] [place] [address] [address], [address]

## 2023-01-31 NOTE — PROGRESS NOTES
Problem: Gas Exchange - Impaired  Goal: Absence of hypoxia  Outcome: Progressing Towards Goal     Problem: General Medical Care Plan  Goal: *Vital signs within specified parameters  Outcome: Progressing Towards Goal     Problem: General Medical Care Plan  Goal: *Skin integrity maintained  Outcome: Progressing Towards Goal     Problem: Patient Education: Go to Patient Education Activity  Goal: Patient/Family Education  Outcome: Progressing Towards Goal

## 2023-01-31 NOTE — PROGRESS NOTES
Problem: Self Care Deficits Care Plan (Adult)  Goal: *Acute Goals and Plan of Care (Insert Text)  Description: FUNCTIONAL STATUS PRIOR TO ADMISSION: Patient was independent and active without use of DME. Patient was independent for basic and instrumental ADLs. Pt drives and participates in IADLs. HOME SUPPORT: Pt and wife live with daughter. Occupational Therapy Goals  Initiated 1/30/2023  1. Patient will perform grooming, standing at sink, with supervision/set-up within 7 day(s). 2.  Patient will perform lower body dressing with modified independence within 7 day(s). 3.  Patient will perform bathing with supervision/set-up within 7 day(s). 4.  Patient will perform toilet transfers with modified independence within 7 day(s). 5.  Patient will perform all aspects of toileting with modified independence within 7 day(s). 6.  Patient will participate in upper extremity therapeutic exercise/activities with independence for 10 minutes within 7 day(s). 7.  Patient will utilize energy conservation techniques during functional activities with verbal cues within 7 day(s). Outcome: Progressing Towards Goal     OCCUPATIONAL THERAPY TREATMENT  Patient: Diya Morgan (21 y.o. male)  Date: 1/31/2023  Diagnosis: COVID [U07.1] <principal problem not specified>      Precautions: Fall, Skin (droplet +)  Chart, occupational therapy assessment, plan of care, and goals were reviewed. ASSESSMENT  Patient received semi supine in bed A&O to self, place and time (for place oriented to being in a hospital able to choose [address] [place] [address] Avenue with options provided) and agreeable for OT tx. Patient continues with skilled OT services and is progressing towards goals with increased orientation and increase safety noted today compared to prior treatment.  Patient is supervision for all bed mobility, SBA LB dressing seated EOB doff/rodriguez socks, SBA upper body dressing standing, SBA grooming standing sink (oral/facial/hand hygiene while standing at sink for aprox 10 minutes). Patient would benefit from continued skilled OT services while at [facility] in order to increase safety and independence with self care and functional transfers/mobility. D/C recommendation TBD between SNF vs HHOT (with 24/7 care) pending progress next session. Other factors to consider for discharge: time since onset, severity of deficits, PLOF         PLAN :  Patient continues to benefit from skilled intervention to address the above impairments. Continue treatment per established plan of care to address goals. Recommend with staff: assist with toilet transfers, up to chair for meals    Recommend next OT session: continue to progress towards goals    Recommendation for discharge: (in order for the patient to meet his/her long term goals)  TBD SNF vs HHOT (with 24/7 care) pending progress next session    This discharge recommendation:  Has been made in collaboration with the attending provider and/or case management    IF patient discharges home will need the following DME: TBD       SUBJECTIVE:   Patient stated I just walk around.  when asking if patient uses any equipment for ambulation at home    OBJECTIVE DATA SUMMARY:   Cognitive/Behavioral Status:  Neurologic State: Alert  Orientation Level: Oriented to person;Oriented to time (oriented to being in a hospital, able to choose [address] with options provided)  Cognition: Poor safety awareness; Follows commands     Functional Mobility and Transfers for ADLs:  Bed Mobility:  Rolling: Supervision  Supine to Sit: Supervision  Sit to Supine: Supervision  Scooting: Supervision    Transfers:  Sit to Stand: Stand-by assistance  Functional Transfers  Bathroom Mobility: Contact guard assistance  Toilet Transfer : Contact guard assistance  Bed to Chair: Contact guard assistance    Balance:  Sitting: Intact  Sitting - Static: Good (unsupported)  Sitting - Dynamic: Good (unsupported)  Standing: Impaired; Without support  Standing - Static: Good; Unsupported  Standing - Dynamic : Fair;Unsupported    ADL Intervention:     Grooming  Grooming Assistance: Stand-by assistance  Position Performed: Standing  Washing Face: Stand-by assistance  Washing Hands: Stand-by assistance  Brushing Teeth: Stand-by assistance     Upper Body [address]: Stand-by assistance    Lower Body Dressing Assistance  Socks: Stand-by assistance  Leg Crossed Method Used: Yes  Position Performed: Seated edge of bed    Pain:  No pain reported    Activity Tolerance:   Good    After treatment patient left in no apparent distress:   Supine in bed, Call bell within reach, and Bed / chair alarm activated    COMMUNICATION/COLLABORATION:   The patients plan of care was discussed with: Physical therapist and Registered nurse.      Stacey Hernandez  Time Calculation: 32 mins

## 2023-01-31 NOTE — CONSULTS
[facility]     Name: Gato Davis MRN: [id number]   : 1940 Hospital: [address]   Date: 2023        Impression Plan   Acute respiratory failure  Hypoxia   Bronchitis- suspect pt has underlying COPD  CKD  Tobacco abuse               Suspect that pt has underlying COPD and is having a COPD exacerbation due to COVID 19 virus. Continue dexamethasone for 10 days total  CXR today with no infitlrates  No baricitinib - pt clinically improving  PT/OT  OOB into chair  Can discharge from a pulmonary standpoint           Radiology  ( personally reviewed) CXR reviewed: no infiltrates   ABG No results for input(s): PHI, PO2I, PCO2I in the last 72 hours. Subjective     [de-identified] yo with PMHx of CKD and tobacco abuse who presents with generalized fatigues and fevers. Pt himself cannot remember why he was brought to the hospital. He denies shortness of breath or cough. Does not know how long he has felt sick. Has smoked 3/4 ppd since his teenages. Denies any hx of lung disease. On admission he was hypoxic and febrile. He is currently on 2 liters. Is COVID 19 vaccinated    Overnight events:  Now on RA. Feels better and stronger. Past Medical History:   Diagnosis Date    CKD (chronic kidney disease) stage 3, GFR 30-59 ml/min ([facility]) 10/03/2014    Other ill-defined conditions(799.89)     recent cold, taking benadryl for sinus congestion    Tobacco abuse       Past Surgical History:   Procedure Laterality Date    COLONOSCOPY N/A 2021    COLONOSCOPY performed by Tamika Frankel MD at [facility] OTHER SURGICAL      colonoscopy    AR UNLISTED [address]        Prior to Admission medications    Medication Sig Start Date End Date Taking? Authorizing Provider   amoxicillin 500 mg tab Take  by mouth. Provider, Historical   traMADol (ULTRAM) 50 mg tablet Take 1 Tab by mouth every six (6) hours as needed for Pain.  Max Daily Amount: 200 mg. Patient not taking: Reported on 11/16/2021 1/6/19   Stuart Patricio DO   Aspirin-Caffeine [facility]) 845-65 mg pwpk Take  by mouth. Other, MD Randal     Current Facility-Administered Medications   Medication Dose Route Frequency    guaiFENesin ER (MUCINEX) tablet 600 mg  600 mg Oral Q12H    albuterol (PROVENTIL HFA, VENTOLIN HFA, PROAIR HFA) inhaler 2 Puff  2 Puff Inhalation Q6H RT    tiotropium bromide (SPIRIVA RESPIMAT) 2.5 mcg /actuation  2 Puff Inhalation DAILY    sodium chloride (NS) flush 5-40 mL  5-40 mL IntraVENous Q8H    enoxaparin (LOVENOX) injection 40 mg  40 mg SubCUTAneous DAILY    dexAMETHasone (DECADRON) tablet 6 mg  6 mg Oral DAILY     No Known Allergies   Social History     Tobacco Use    Smoking status: Every Day     Packs/day: 0.25     Types: Cigarettes    Smokeless tobacco: Never   Substance Use Topics    Alcohol use: No      Family History   Problem Relation Age of Onset    Other Other         patient does not know          Laboratory: I have personally reviewed the critical care flowsheet and labs.      Recent Labs     01/31/23  0250 01/29/23  2153   WBC 6.0 7.1   HGB 11.7* 12.6   HCT 34.3* 36.5*    165       Recent Labs     01/31/23  0250 01/29/23  2153    144   K 4.0 3.8   * 106   CO2 27 28   * 105*   BUN 18 12   CREA 1.27 1.27*   CA 9.1 9.4   ALB  --  4.0   ALT  --  9*         Objective:     Mode Rate Tidal Volume Pressure FiO2 PEEP                    Vital Signs:     TMAX(24)      Intake/Output:   Last shift:         Last 3 shifts: 01/31 0701 - 01/31 1900  In: 350 [P.O.:350]  Out: - RRIOLAST3  Intake/Output Summary (Last 24 hours) at 1/31/2023 1333  Last data filed at 1/31/2023 1031  Gross per 24 hour   Intake 826 ml   Output 400 ml   Net 426 ml     EXAM:   GENERAL: awake, alert, HEENT:  PERRL, EOMI, no alar flaring or epistaxis, oral mucosa moist without cyanosis, NECK:  no jugular vein distention, no retractions, no thyromegaly or masses, LUNGS: distant breathsounds bilaterally HEART:  Regular rate and rhythm with no MGR; trace edema is present, ABDOMEN:  soft with no tenderness, bowel sounds present, EXTREMITIES:  warm with no cyanosis, SKIN:  no jaundice or ecchymosis, and NEUROLOGIC:  alert and oriented, grossly non-focal    Jeanette Plasencia MD  [facility]

## 2023-01-31 NOTE — PROGRESS NOTES
[facility]  [address], [facility] [address]  [phone]         Hospitalist Progress Note        NAME:  Lisa Perez   :  1940   MRN:  [id number]    Date/Time:  2023     Patient PCP:  Abena Melton MD    Emergency Contact:    Extended Emergency Contact Information  Primary Emergency Contact:  [facility] [address] Phone: [phone]  Mobile Phone: [phone]  Relation: Friend      Code: Full Code     Isolation Precautions: Droplet Plus        Subjective:     REASON FOR VISIT:  Recheck at 25 and hypoxia    HPI & INTERVAL HISTORY:     Mr. Jorge Lee is a [de-identified] y.o. male with history that includes CKD stage IIIa and apparently dementia presents with weakness with frequent falls and fever. Found to be COVID-19 positive and hypoxic and started on oxygen. : The patient was seen and examined. Discussed case with daughter at bedside. He is off his oxygen denies shortness of breath and has had no fevers or chills. : Patient seen and examined. Appears somewhat confused. Had no complaints and does report mild shortness of breath currently on 2 L nasal cannula. Denies chest pain and abdominal pain.       ALLERGIES  No Known Allergies         Objective:      Visit Vitals  BP (!) 149/66 (BP 1 Location: Left upper arm, BP Patient Position: At rest)   Pulse 61   Temp 98.6 °F (37 °C)   Resp 18   Ht 5' 10\" (1.778 m)   Wt 78.5 kg (173 lb 1 oz)   SpO2 96%   BMI 24.83 kg/m²       General: alert and no distress  Head: Normocephalic, without obvious abnormality, atraumatic  Eyes: anicteric sclerae and conjuntiva clear  ENT: lips, mucosa, and tongue normal  Neck: normal, supple, and no tenderness  Lungs: clear to auscultation  Heart: S1, S2 normal, regular rate, and regular rhythm  Abd: not distended, soft, nontender, BS present and normactive  Ext: no cyanosis and no edema  Skin: normal skin color, no rashes, and texture normal  Neuro: Oriented only to self  Psych: not anxious, cooperative, appropriate affect      Medications:  Current Facility-Administered Medications   Medication Dose Route Frequency    guaiFENesin ER (MUCINEX) tablet 600 mg  600 mg Oral Q12H    albuterol (PROVENTIL HFA, VENTOLIN HFA, PROAIR HFA) inhaler 2 Puff  2 Puff Inhalation Q6H RT    tiotropium bromide (SPIRIVA RESPIMAT) 2.5 mcg /actuation  2 Puff Inhalation DAILY    sodium chloride (NS) flush 5-40 mL  5-40 mL IntraVENous Q8H    sodium chloride (NS) flush 5-40 mL  5-40 mL IntraVENous PRN    acetaminophen (TYLENOL) tablet 650 mg  650 mg Oral Q6H PRN    Or    acetaminophen (TYLENOL) suppository 650 mg  650 mg Rectal Q6H PRN    polyethylene glycol (MIRALAX) packet 17 g  17 g Oral DAILY PRN    ondansetron (ZOFRAN ODT) tablet 4 mg  4 mg Oral Q8H PRN    Or    ondansetron (ZOFRAN) injection 4 mg  4 mg IntraVENous Q6H PRN    enoxaparin (LOVENOX) injection 40 mg  40 mg SubCUTAneous DAILY    dexAMETHasone (DECADRON) tablet 6 mg  6 mg Oral DAILY    benzonatate (TESSALON) capsule 100 mg  100 mg Oral TID PRN        Labs:  Recent Labs     01/31/23  0250   WBC 6.0   HGB 11.7*   HCT 34.3*          Recent Labs     01/31/23  0250 01/29/23  2153    144   K 4.0 3.8   * 106   CO2 27 28   * 105*   BUN 18 12   CREA 1.27 1.27*   CA 9.1 9.4   ALB  --  4.0   TBILI  --  0.4   ALT  --  9*         Radiology:  No results found. I personally reviewed and interpreted the imaging studies and agree with official reading    The chart, ER course, labs, imaging studies, and medications was reviewed by me on: January 31, 2023         Assessment/Plan:      26-year-old man with history of CKD stage IIIa presenting with acute onset of dyspnea, chest congestion, and fever secondary to COVID-19, with generalized weakness and increasing falls at home. Acute hypoxic respiratory failure due to COVID-19 virus infection also causing acute febrile illness  Improving with hypoxia resolved. Continue with bronchodilators, Mucinex, Tessalon Perles, and dexamethasone  No need for antibiotics at this time  Supportive care  Chest x-ray was clear  Inflammatory markers for the most part unremarkable except for elevated CRP. Bilateral lower extremity edema  Lower extremity Dopplers negative for DVT  Prophylactic Lovenox for now, increase to therapeutic anticoagulation if venous      Frequent falls  Generalized weakness  Fall precautions  Consult PT and OT as well as case management for dispo planning    Confusional state likely undiagnosed dementia:  Per daughter Aaliyah Mcmullen at bedside patient seems to be getting more forgetful over the past year. She reports that he is also recently not bathing on his own. After initially considering taking him back home with home health care at this point she is in agreement for SNF. PT OT consulted. Case management on the case.      CKD stage IIIa  Creatinine at baseline  Monitor BMP  No IV fluids for now due to COVID-19     Body mass index is 24.83 kg/m².:  18.5 - 24.9:  Normal weight    F: None  E: Monitor  N: ADULT DIET Regular       Risk of deterioration: high      Discussed:  Pt's condition, Imaging findings, Lab findings, Assessment, and Care Plan discussed with: Patient, RN, and Care Manager    Prophylaxis:  Lovenox SQ    Anticipated discharge disposition:  SNF    HR DC Barriers: Currently not medically stable for discharge      Total time: -35- minutes **I personally saw and examined the patient during this time period**                   ___________________________________________________    Signed:    Wayne Islas MD

## 2023-01-31 NOTE — PROGRESS NOTES
Bedside and Verbal shift change report given to Olayinka Moncada RN (oncoming nurse) by Mariah Thompson RN (offgoing nurse). Report included the following information SBAR, Kardex, Intake/Output, and MAR.

## 2023-02-01 VITALS
HEIGHT: 70 IN | RESPIRATION RATE: 16 BRPM | TEMPERATURE: 98.3 F | OXYGEN SATURATION: 94 % | HEART RATE: 61 BPM | SYSTOLIC BLOOD PRESSURE: 118 MMHG | BODY MASS INDEX: 24.78 KG/M2 | DIASTOLIC BLOOD PRESSURE: 61 MMHG | WEIGHT: 173.06 LBS

## 2023-02-01 LAB
ANION GAP SERPL CALC-SCNC: 4 MMOL/L (ref 5–15)
BASOPHILS # BLD: 0 K/UL (ref 0–0.1)
BASOPHILS NFR BLD: 0 % (ref 0–1)
BUN SERPL-MCNC: 24 MG/DL (ref 6–20)
BUN/CREAT SERPL: 20 (ref 12–20)
CALCIUM SERPL-MCNC: 9.2 MG/DL (ref 8.5–10.1)
CHLORIDE SERPL-SCNC: 110 MMOL/L (ref 97–108)
CO2 SERPL-SCNC: 28 MMOL/L (ref 21–32)
CREAT SERPL-MCNC: 1.21 MG/DL (ref 0.7–1.3)
CRP SERPL-MCNC: 0.84 MG/DL (ref 0–0.6)
D DIMER PPP FEU-MCNC: 1.3 MG/L FEU (ref 0–0.65)
DIFFERENTIAL METHOD BLD: ABNORMAL
EOSINOPHIL # BLD: 0 K/UL (ref 0–0.4)
EOSINOPHIL NFR BLD: 0 % (ref 0–7)
ERYTHROCYTE [DISTWIDTH] IN BLOOD BY AUTOMATED COUNT: 14.2 % (ref 11.5–14.5)
GLUCOSE SERPL-MCNC: 115 MG/DL (ref 65–100)
HCT VFR BLD AUTO: 32.3 % (ref 36.6–50.3)
HGB BLD-MCNC: 11.4 G/DL (ref 12.1–17)
IMM GRANULOCYTES # BLD AUTO: 0 K/UL (ref 0–0.04)
IMM GRANULOCYTES NFR BLD AUTO: 0 % (ref 0–0.5)
LYMPHOCYTES # BLD: 1.5 K/UL (ref 0.8–3.5)
LYMPHOCYTES NFR BLD: 18 % (ref 12–49)
MCH RBC QN AUTO: 34.1 PG (ref 26–34)
MCHC RBC AUTO-ENTMCNC: 35.3 G/DL (ref 30–36.5)
MCV RBC AUTO: 96.7 FL (ref 80–99)
MONOCYTES # BLD: 0.8 K/UL (ref 0–1)
MONOCYTES NFR BLD: 9 % (ref 5–13)
NEUTS SEG # BLD: 6 K/UL (ref 1.8–8)
NEUTS SEG NFR BLD: 73 % (ref 32–75)
NRBC # BLD: 0 K/UL (ref 0–0.01)
NRBC BLD-RTO: 0 PER 100 WBC
PLATELET # BLD AUTO: 162 K/UL (ref 150–400)
PMV BLD AUTO: 11.9 FL (ref 8.9–12.9)
POTASSIUM SERPL-SCNC: 4.1 MMOL/L (ref 3.5–5.1)
RBC # BLD AUTO: 3.34 M/UL (ref 4.1–5.7)
SODIUM SERPL-SCNC: 142 MMOL/L (ref 136–145)
WBC # BLD AUTO: 8.3 K/UL (ref 4.1–11.1)

## 2023-02-01 PROCEDURE — 74011250637 HC RX REV CODE- 250/637: Performed by: HOSPITALIST

## 2023-02-01 PROCEDURE — 85025 COMPLETE CBC W/AUTO DIFF WBC: CPT

## 2023-02-01 PROCEDURE — 85379 FIBRIN DEGRADATION QUANT: CPT

## 2023-02-01 PROCEDURE — 94664 DEMO&/EVAL PT USE INHALER: CPT

## 2023-02-01 PROCEDURE — 36415 COLL VENOUS BLD VENIPUNCTURE: CPT

## 2023-02-01 PROCEDURE — 94761 N-INVAS EAR/PLS OXIMETRY MLT: CPT

## 2023-02-01 PROCEDURE — 74011250636 HC RX REV CODE- 250/636: Performed by: INTERNAL MEDICINE

## 2023-02-01 PROCEDURE — 80048 BASIC METABOLIC PNL TOTAL CA: CPT

## 2023-02-01 PROCEDURE — 86140 C-REACTIVE PROTEIN: CPT

## 2023-02-01 PROCEDURE — 74011250637 HC RX REV CODE- 250/637: Performed by: INTERNAL MEDICINE

## 2023-02-01 PROCEDURE — 94640 AIRWAY INHALATION TREATMENT: CPT

## 2023-02-01 PROCEDURE — 74011000250 HC RX REV CODE- 250: Performed by: INTERNAL MEDICINE

## 2023-02-01 RX ORDER — BENZONATATE 100 MG/1
100 CAPSULE ORAL
Qty: 30 CAPSULE | Refills: 0 | Status: SHIPPED
Start: 2023-02-01 | End: 2023-02-08

## 2023-02-01 RX ORDER — DEXAMETHASONE 6 MG/1
6 TABLET ORAL
Qty: 7 TABLET | Refills: 0 | Status: SHIPPED
Start: 2023-02-01

## 2023-02-01 RX ORDER — ACETAMINOPHEN 325 MG/1
650 TABLET ORAL
Qty: 30 TABLET | Refills: 0 | Status: SHIPPED
Start: 2023-02-01

## 2023-02-01 RX ORDER — GUAIFENESIN 600 MG/1
600 TABLET, EXTENDED RELEASE ORAL EVERY 12 HOURS
Qty: 30 TABLET | Refills: 0 | Status: SHIPPED
Start: 2023-02-01

## 2023-02-01 RX ORDER — ALBUTEROL SULFATE 90 UG/1
2 AEROSOL, METERED RESPIRATORY (INHALATION)
Qty: 18 G | Refills: 0 | Status: SHIPPED
Start: 2023-02-01

## 2023-02-01 RX ADMIN — DEXAMETHASONE 6 MG: 6 TABLET ORAL at 09:22

## 2023-02-01 RX ADMIN — TIOTROPIUM BROMIDE INHALATION SPRAY 2 PUFF: 3.12 SPRAY, METERED RESPIRATORY (INHALATION) at 07:54

## 2023-02-01 RX ADMIN — ALBUTEROL SULFATE 2 PUFF: 90 AEROSOL, METERED RESPIRATORY (INHALATION) at 07:54

## 2023-02-01 RX ADMIN — GUAIFENESIN 600 MG: 600 TABLET ORAL at 09:22

## 2023-02-01 RX ADMIN — SODIUM CHLORIDE, PRESERVATIVE FREE 10 ML: 5 INJECTION INTRAVENOUS at 06:52

## 2023-02-01 RX ADMIN — ENOXAPARIN SODIUM 40 MG: 100 INJECTION SUBCUTANEOUS at 09:22

## 2023-02-01 NOTE — PROGRESS NOTES
I have reviewed discharge instructions with the patient. The patient verbalized understanding. IV removed without difficulty    Telephone report called to Melchor Redmond, nurse at [facility]. Report included the following information SBAR, Kardex, ED Summary, OR Summary, MAR, Accordion, Recent Results, and Med Rec Status.

## 2023-02-01 NOTE — DISCHARGE INSTRUCTIONS
Patient Discharge Instructions    Tiffanie Wolfe / [id number] : 1940    Admitted 2023 Discharged: 2023       Discharge Medications: It is important that you take the medication exactly as they are prescribed. Keep your medication in the bottles provided by the pharmacist and keep a list of the medication names, dosages, and times to be taken in your wallet. Do not take other medications without consulting your doctor. Call your PCP for medication refills      What to do at Home    Take medications as prescribed and follow up with Facility provider and PCP. Call your PCP for refills of your medications.       Recommended Diet: Regular Diet ADULT DIET Regular       Recommended Activity: Activity as tolerated      **If you experience any of the following symptoms chest pain, shortness of breath, fevers, or chills, please follow up with PCP or go to the nearest ER.**         Fran Campos MD     2023

## 2023-02-01 NOTE — PROGRESS NOTES
Home oxygen assessment requested. Spoke with OT who spoke with CM. Patient planning to d/c to SNF facility today, d/c orders in place. Home oxygen assessment not indicated at this time.      Thank you,  Feroz Hernandez, PT, DPT

## 2023-02-01 NOTE — PROGRESS NOTES
Pt ordered for Home O2 evaluation. Pt is a high fall risk and being followed by PT/OT. Spoke with [facility], OT to assess.

## 2023-02-01 NOTE — CONSULTS
[facility]     Name: Tiffanie Wolfe MRN: [id number]   : 1940 Hospital: [address]   Date: 2023        Impression Plan   Acute respiratory failure  Hypoxia - resolved  Bronchitis- suspect pt has underlying COPD  CKD  Tobacco abuse               Suspect that pt has underlying COPD and is having a COPD exacerbation due to COVID 19 virus. Continue dexamethasone  RT eval for home O2 needs  No infiltrates on CXR, will hold on baricitinib and no indication for additional abx  Trend d-dimer/CRP  Difficult to discern how long pt has been sick since he denies sxs. PT/OT  OOB into chair  Lovenox    Can likely be discharged soon. Would benefit from outpatient pulmonary follow up with PFTs. Radiology  (personally reviewed) CXR reviewed: no infiltrates       Subjective     [de-identified] yo with PMHx of CKD and tobacco abuse who presents with generalized fatigues and fevers. Pt himself cannot remember why he was brought to the hospital. He denies shortness of breath or cough. Does not know how long he has felt sick. Has smoked 3/4 ppd since his teenages. Denies any hx of lung disease. On admission he was hypoxic and febrile. He is currently on 2 liters. Is COVID 19 vaccinated    Interval history  Afebrile  BP stable  Sats 95% on RA  WBC 8.3  Hgb 11.4  Blood cx no growth x 2 days    2023 BLE VD neg for DVT  2023 CXR: Lungs are clear. Review of Systems: Has no complaints today. Denies fever, chills, or cough. Denies SOB or CP. RLE still with some swelling - no pain.       Past Medical History:   Diagnosis Date    CKD (chronic kidney disease) stage 3, GFR 30-59 ml/min ([facility]) 10/03/2014    Other ill-defined conditions(799.89)     recent cold, taking benadryl for sinus congestion    Tobacco abuse       Past Surgical History:   Procedure Laterality Date    COLONOSCOPY N/A 2021    COLONOSCOPY performed by Mitch Cohn MD at [address] [place] [address] HX OTHER SURGICAL      colonoscopy    [place] UNLISTED PROCEDURE ABDOMEN PERITONEUM & OMENTUM        Prior to Admission medications    Medication Sig Start Date End Date Taking? Authorizing Provider   amoxicillin 500 mg tab Take  by mouth. Provider, Historical   traMADol (ULTRAM) 50 mg tablet Take 1 Tab by mouth every six (6) hours as needed for Pain. Max Daily Amount: 200 mg. Patient not taking: Reported on 11/16/2021 1/6/19   Jaquelin Carrillo DO   Aspirin-Caffeine [facility]) 845-65 mg pwpk Take  by mouth. Other, MD Randal     Current Facility-Administered Medications   Medication Dose Route Frequency    guaiFENesin ER (MUCINEX) tablet 600 mg  600 mg Oral Q12H    albuterol (PROVENTIL HFA, VENTOLIN HFA, PROAIR HFA) inhaler 2 Puff  2 Puff Inhalation Q6H RT    tiotropium bromide (SPIRIVA RESPIMAT) 2.5 mcg /actuation  2 Puff Inhalation DAILY    sodium chloride (NS) flush 5-40 mL  5-40 mL IntraVENous Q8H    enoxaparin (LOVENOX) injection 40 mg  40 mg SubCUTAneous DAILY    dexAMETHasone (DECADRON) tablet 6 mg  6 mg Oral DAILY     No Known Allergies   Social History     Tobacco Use    Smoking status: Every Day     Packs/day: 0.25     Types: Cigarettes    Smokeless tobacco: Never   Substance Use Topics    Alcohol use: No      Family History   Problem Relation Age of Onset    Other Other         patient does not know          Laboratory: I have personally reviewed the flowsheet and labs.      Recent Labs     02/01/23  0316 01/31/23  0250 01/29/23  2153   WBC 8.3 6.0 7.1   HGB 11.4* 11.7* 12.6   HCT 32.3* 34.3* 36.5*    151 165       Recent Labs     02/01/23  0316 01/31/23  0250 01/29/23  2153    143 144   K 4.1 4.0 3.8   * 109* 106   CO2 28 27 28   * 126* 105*   BUN 24* 18 12   CREA 1.21 1.27 1.27*   CA 9.2 9.1 9.4   ALB  --   --  4.0   ALT  --   --  9*         Objective:   Visit Vitals  /60 (BP 1 Location: Right arm, BP Patient Position: At rest)   Pulse 61   Temp 97.8 °F (36.6 °C) Resp 16   Ht 5' 10\" (1.778 m)   Wt 78.5 kg (173 lb 1 oz)   SpO2 95%   BMI 24.83 kg/m²       Intake/Output Summary (Last 24 hours) at 2/1/2023 7680  Last data filed at 2/1/2023 0533  Gross per 24 hour   Intake 850 ml   Output --   Net 850 ml     EXAM:   GENERAL: awake, alert, HEENT:  anicteric, EOMI, no alar flaring or epistaxis, oral mucosa moist without cyanosis, NECK:  no jugular vein distention, no retractions, no thyromegaly or masses, LUNGS: CTA bilaterally HEART:  Regular rate and rhythm with no MGR; trace edema is present, ABDOMEN:  soft with no tenderness, bowel sounds present, EXTREMITIES:  warm with no cyanosis,  RLE swollen compared to LLE; nontender SKIN:  no jaundice or ecchymosis, and NEUROLOGIC:  alert and oriented, grossly non-focal    Anh Semen, PA  [facility]

## 2023-02-01 NOTE — PROGRESS NOTES
Problem: Pressure Injury - Risk of  Goal: *Prevention of pressure injury  Description: Document Kg Scale and appropriate interventions in the flowsheet. Outcome: Progressing Towards Goal  Note: Pressure Injury Interventions:  Sensory Interventions: Assess changes in LOC, Float heels, Keep linens dry and wrinkle-free, Minimize linen layers    Moisture Interventions: Absorbent underpads, Minimize layers, Moisture barrier    Activity Interventions: Increase time out of bed, PT/OT evaluation    Mobility Interventions: Float heels, HOB 30 degrees or less, PT/OT evaluation    Nutrition Interventions: Document food/fluid/supplement intake    Friction and Shear Interventions: HOB 30 degrees or less, Minimize layers                Problem: Patient Education: Go to Patient Education Activity  Goal: Patient/Family Education  Outcome: Progressing Towards Goal     Problem: General Medical Care Plan  Goal: *Vital signs within specified parameters  Outcome: Progressing Towards Goal  Goal: *Labs within defined limits  Outcome: Progressing Towards Goal  Goal: *Absence of infection signs and symptoms  Outcome: Progressing Towards Goal  Goal: *Optimal pain control at patient's stated goal  Outcome: Progressing Towards Goal  Goal: *Skin integrity maintained  Outcome: Progressing Towards Goal  Goal: *Fluid volume balance  Outcome: Progressing Towards Goal  Goal: *Optimize nutritional status  Outcome: Progressing Towards Goal  Goal: *Anxiety reduced or absent  Outcome: Progressing Towards Goal  Goal: *Progressive mobility and function (eg: ADL's)  Outcome: Progressing Towards Goal     Problem: Patient Education: Go to Patient Education Activity  Goal: Patient/Family Education  Outcome: Progressing Towards Goal     Problem: Falls - Risk of  Goal: *Absence of Falls  Description: Document [place] Fall Risk and appropriate interventions in the flowsheet.   Outcome: Progressing Towards Goal     Problem: Patient Education: Go to Patient Education Activity  Goal: Patient/Family Education  Outcome: Progressing Towards Goal

## 2023-02-01 NOTE — DISCHARGE SUMMARY
[facility] 79  [address] [facility] 19  [phone] [id number] SUMMARY        Name:  Eriberto Mcfarlane, [de-identified] y.o.  :    1940  MRN:    [id number]  PCP:    Ngozi Gonzales MD    Code: Full Code    Date of Admission: 2023  Date of Discharge:   2023 10:44 AM  Disposition:  SNF  Condition:  improved, stable, and good    Consultations:  IP CONSULT TO PULMONOLOGY    Reason for Admission:   [place] [U07.1]    Discharge Diagnoses: Active Problems:    COVID (2023)          Procedures:  None      Excerpted HPI from H&P of Jace Singh MD:  Eriberto Mcfarlane is a [de-identified] y.o.  male with history of CKD stage IIIa and frequent falls presenting with cute onset of generalized weakness and fatigue. The patient moved in with his daughter and has had been having increasing falls. He reports no significant injury. He also admits to mild dyspnea and chest congestion. No significant cough. He was found to have a fever of 102. COVID-19 testing was positive. Patient states that he has been vaccinated. He has no other sick contacts. In addition to the dyspnea he has had increasing bilateral lower extremity edema for the last few weeks. He has no pain in his legs. No rash. The patient desaturated to 88% on room air and was placed on 2 L via nasal cannula. He is being admitted to the hospitalist service for further evaluation and treatment. Brief Hospital Course:  Mr. Pete Bernstein is a [de-identified] y.o. male with history that includes CKD stage IIIa and apparently dementia presents with weakness with frequent falls and fever. Found to be COVID-19 positive and hypoxic and started on oxygen. DC with meds and follow ups below. : Doing well with no SOB, cough, fevers or CP. Remains off of oxygen. .Will be DC to SNF today. Mentation better today but appears to have dementia and is at baseline. : The patient was seen and examined.   Discussed case with daughter at bedside. He is off his oxygen denies shortness of breath and has had no fevers or chills. 1/30: Patient seen and examined. Appears somewhat confused. Had no complaints and does report mild shortness of breath currently on 2 L nasal cannula. Denies chest pain and abdominal pain. Acute hypoxic respiratory failure due to COVID-19 virus infection also causing acute febrile illness  Resolved  Continue with bronchodilators, Mucinex, Tessalon Perles, and dexamethasone x 10 days total  No need for antibiotics at this time  Supportive care  Chest x-ray was clear  Inflammatory markers for the most part unremarkable except for elevated CRP. Bilateral lower extremity edema  Lower extremity Dopplers negative for DVT     Frequent falls  Generalized weakness  Fall precautions  SNF DC     Confusional state likely undiagnosed dementia:  Per daughter Yvan Benites at bedside patient seems to be getting more forgetful over the past year. She reports that he is also recently not bathing on his own. After initially considering taking him back home with home health care at this point she is in agreement for SNF.        CKD stage IIIa  Creatinine at baseline  Monitor BMP  No IV fluids given now due to COVID-19  Encourage oral fluids     Body mass index is 24.83 kg/m².:  18.5 - 24.9:  Normal weight      Visit Vitals  /60 (BP 1 Location: Right arm, BP Patient Position: At rest)   Pulse 61   Temp 97.8 °F (36.6 °C)   Resp 16   Ht 5' 10\" (1.778 m)   Wt 78.5 kg (173 lb 1 oz)   SpO2 95%   BMI 24.83 kg/m²       Physical Exam:  General: alert, well appearing, and no distress  Head: Normocephalic, without obvious abnormality, atraumatic  Eyes: PERRL, EOMI, anicteric sclerae, and conjuntiva clear  ENT: lips, mucosa, and tongue normal  Neck: normal, supple, and no tenderness  Lungs: clear to auscultation with good breath sounds and normal respiratory effort  Heart: S1, S2 normal, regular rate, and regular rhythm  Abd: not distended, soft, nontender, BS present and normactive  Ext: no cyanosis and bilateral lower extremity trace edema  Skin: normal skin color, no rashes, and texture normal  Neuro:  Orienbted to self and location. Psych: not anxious, cooperative, appropriate affect      Labs:  Recent Labs     02/01/23 0316   WBC 8.3   HGB 11.4*   HCT 32.3*        Recent Labs     02/01/23  0316 01/31/23  0250 01/29/23  2153      < > 144   K 4.1   < > 3.8   *   < > 106   CO2 28   < > 28   *   < > 105*   BUN 24*   < > 12   CREA 1.21   < > 1.27*   CA 9.2   < > 9.4   ALB  --   --  4.0   TBILI  --   --  0.4   ALT  --   --  9*    < > = values in this interval not displayed. No results found. Immunizations Given During Admission:   None        PATIENT INSTRUCTIONS       Activity: Activity as tolerated  Diet:  ADULT DIET Regular   Wound Care:  None  Follow-up(s): Follow-up Information       Follow up With Specialties Details Why Contact Info    Abena Melton MD Family Medicine Follow up in 2 week(s)  [facility] [place]  [phone]      SNF Facility Provider  Follow up in 1 day(s)             Current Discharge Medication List        START taking these medications    Details   albuterol (PROVENTIL HFA, VENTOLIN HFA, PROAIR HFA) 90 mcg/actuation inhaler Take 2 Puffs by inhalation every four (4) hours as needed for Wheezing, Shortness of Breath or Respiratory Distress. Qty: 18 g, Refills: 0      benzonatate (TESSALON) 100 mg capsule Take 1 Capsule by mouth three (3) times daily as needed for Cough for up to 7 days. Qty: 30 Capsule, Refills: 0      dexAMETHasone (DECADRON) 6 mg tablet Take 1 Tablet by mouth Daily (before breakfast). Qty: 7 Tablet, Refills: 0      guaiFENesin ER (MUCINEX) 600 mg ER tablet Take 1 Tablet by mouth every twelve (12) hours.   Qty: 30 Tablet, Refills: 0      tiotropium bromide (SPIRIVA RESPIMAT) 2.5 mcg/actuation inhaler Take 2 Puffs by inhalation daily for 9 days.  Ronita Gins: 4 g, Refills: 0      acetaminophen (TYLENOL) 325 mg tablet Take 2 Tablets by mouth every six (6) hours as needed for Pain or Fever.   Qty: 30 Tablet, Refills: 0           STOP taking these medications       amoxicillin 500 mg tab Comments:   Reason for Stopping:         traMADol (ULTRAM) 50 mg tablet Comments:   Reason for Stopping:         Aspirin-Caffeine (BC) 845-65 mg pwpk Comments:   Reason for Stopping:               Discharge Plan D/W:  Patient and Care Manager          **Total time spent coordinating discharge (preparing & going over instructions, follow-ups, prescriptions, and documentation):  31 minutes                 _____________________________________    Signed:   Janean Buerger, MD   Date of service:    2/1/2023         CC: Shante Fleming MD   .

## 2023-02-01 NOTE — PROGRESS NOTES
1:01 PM  Met with patient's daughter who is agreeable to transfer today at 2pm once we clarified the location of [facility]. She thought it was on [facility] , so we looked up the website and reviewed it, she has the number of Emilia Lacey in admissions as well. She shared that her Mother has covid also and she plans to call Papa Olsen about the possibility of her mom admitting there from Home. CM reviewed the possibility that the Mother would need to get medicals from the physicians office since she is not in the [place]. 2pm transport arranged through [facility] at th time. Kerri Woody, made aware. 12:26 PM  DC summary uploaded to [address] [facility] [facility] to confirm ability to accept once she sees that the UAI being completed has been approved by Medicaid. Transport still on [place] with [facility]. 11:03 AM  DC order obtained. Working to coordinate transfer before 12:30pm, will forward discharge summary once it is complete. 09:01 AM  Patient placed on Will Call with [facility], awaiting discharge order to coordinate transfer. Have also reached out to [address] [place] [address] to assure they still have a bed. Transition of Care Plan to SNF/Rehab    Communication to Patient/Family:  Met with patient and family and they are agreeable to the transition plan. The Plan for Transition of Care is related to the following treatment goals: Rehabilitation from covid, strengthen to highest level    The Patient and/or patient representative was provided with a choice of provider and agrees  with the discharge plan. Yes [x] No []    A Freedom of choice list was provided with basic dialogue that supports the patient's individualized plan of care/goals and shares the quality data associated with the providers. Yes [x] No []    SNF/Rehab Transition:  Patient has been accepted to  [address] [facility] SNF/Rehab and meets criteria for admission.    Patient will transported by [facility] and expected to leave at 2pm    Communication to SNF/Rehab:  Bedside RN, Dariusz Marcano, has been notified to update the transition plan to the facility and call report [phone]  Discharge information has been updated on the AVS. And communicated to facility via [employer]      Nursing Please include all hard scripts for controlled substances, med rec and dc summary, and AVS in packet. Reviewed and confirmed with facility, *[facility], can manage the patient care needs for the following:     Sarah [place] with (X) only those applicable:  Medication:  [x]Medications are available at the facility  []IV Antibiotics    [x]Controlled Substance - hard copies available sent. [x]Weekly Labs    Equipment:  []CPAP/BiPAP  []Wound Vacuum  []Aldrich or Urinary Device  []PICC/Central Line  []Nebulizer  []Ventilator    Treatment:  []Isolation (for MRSA, VRE, etc.)  []Surgical Drain Management  []Tracheostomy Care  []Dressing Changes  []Dialysis with transportation  []PEG Care  []Oxygen  []Daily Weights for Heart Failure    Dietary:  []Any diet limitations  []Tube Feedings   []Total Parenteral Management (TPN)    Financial Resources:  []Medicaid Application Completed    [x]UAI Completed and copy given to pt/family  and copy given to pt/family  []A screening has previously been completed. []Level II Completed    [] Private pay individual who will not become   financially eligible for Medicaid within 6 months from admission to a [address] [place] [address]. [] Individual refused to have screening conducted. []Medicaid Application Completed    []The screening denied because it was determined individual did not need/did not qualify for nursing facility level of care. [] Out of state residents seeking direct admission to a [address] facility.   [] Individuals who are inpatients of an out of state hospital, or in state or out of state veterans/ hospital and seek direct admission to a [address] facility  [] Individuals who are pateints or residents of a state owned/operated facility that is licensed by [employer] ([employer]) and seek direct admission to [address] [place] [address]  [] A screening not required for enrollment in [facility] services as set out in [facility]-  [] [facility]) staff shall perform screenings of the [facility] clients. Advanced Care Plan:  []Surrogate Decision Maker of Care  []POA  []Communicated Code Status and copy sent. Other:         Care Management Interventions  PCP Verified by CM:  Yes  Mode of Transport at Discharge: BLS  Transition of Care Consult (CM Consult): Discharge Planning  MyChart Signup: No  Discharge Durable Medical Equipment: No  Health Maintenance Reviewed: Yes  Physical Therapy Consult: Yes  Occupational Therapy Consult: Yes  Speech Therapy Consult: No  Support Systems: Other Family Member(s), Spouse/Significant Other, Child(nancy)  Confirm Follow Up Transport: Family  The Patient and/or Patient Representative was Provided with a Choice of Provider and Agrees with the Discharge Plan?: Yes  Name of the Patient Representative Who was Provided with a Choice of Provider and Agrees with the Discharge Plan: Spoke with dtr  Freedom of Choice List was Provided with Basic Dialogue that Supports the Patient's Individualized Plan of Care/Goals, Treatment Preferences and Shares the Quality Data Associated with the Providers?: Yes  Discharge Location  Patient Expects to be Discharged to[de-identified] Skilled nursing facility

## 2023-02-03 NOTE — PROGRESS NOTES
Physician Progress Note      PATIENT:               Jeanmarie Pallas  CSN #:                  [id number]  :                       1940  ADMIT DATE:       2023 9:35 PM  [address] DATE:        2023 3:01 PM  RESPONDING  PROVIDER #:        Fran Morrell MD          QUERY TEXT:    Good Morning,    This patient admitted on 2023-2023 for COVID-19 Virus Infection. Pulmonary was consulted on , and notes \"Suspect that the patient has underlying COPD and is having a COPD exacerbation due to COVID 19\". If possible, can you please clarify and please document in the medical record including the discharge summary if you agree with Pulmonary regarding the COPD dx.:      The medical record reflects the following:  Risk Factors: [de-identified] yr old , Tobacco abuse, Bronchitis Dementia, so difficulty to assess how long with symptoms. Clinical Indicators: Presented with Dyspnea, COVID 19 positive. Pulm. consulted and notes \"suspect pt has underling COPD. Mild hypoxia 91-93% on RA  Treatment: Dexamethasone, o2 supplement, Mucinex, Tessalon Perles,    Thank you,  Tea Llanos, MARISN, RN, CPHQ, CCDS, SMART  Options provided:  -- Agree, suspect that the patient has underlying COPD and having COPD exacerbation due to COVID 19 confirmed present on admission  -- Disagree, and COPD with exacerbation was ruled out  -- Other - I will add my own diagnosis  -- Disagree - Not applicable / Not valid  -- Disagree - Clinically unable to determine / Unknown  -- Refer to Clinical Documentation Reviewer    PROVIDER RESPONSE TEXT:    The diagnosis of COPD exacerbation was ruled out. Query created by: Rajan Casanova on 2/3/2023 8:12 AM      QUERY TEXT:    Good Morning,    This patient admitted on 2023-2023 for COVID 19. Currently the medical record indicators clinical indicators to support acute hypoxia but is not supporting a diagnosis of acute hypoxic respiratory failure.     In order to support the diagnosis of acute respiratory failure, could you please include additional clinical indicators in your documentation. Or please document if the diagnosis of acute respiratory failure has been ruled out after further study. The medical record reflects the following:  Risk Factors: Age( [de-identified]) COVID 23, Tobacco use, Pulmonary notes possible underlying COPD  Clinical Indicators: Presented with Acute dyspnea, chest congestion. Known Demenita and is somewhat confused ER provider notes, \"He has been mildly hypoxic at 91-93% on RA, There is x1 documentation of o2 sat @ 87% on RA, resp rate max documented 23, pt placed on 2 liters and o2 sats improved to 96%. H&P notes, Positive mild dyspnea, Pulmonary assessment notes \"Distant breath sounds raquel. \"01/30--Pulm assessment notes \"mildy hypoxic, Lungs celar to auscultation with good breath sounds and normal respiratory effort. Pt weaned from the o2, and did not require o2 at discharge. COVID 19 rapid is positive. Treatment: o2 oximetry, o2 supplement (2liters, then weaned by discharged no home o2 required,)- Pulm consulted, Mucinex, Dexamethasone. Thank you  Marla Pascal, BSN,RN, CPHQ, CCDS, SMART  ____________________________________________________    Acute Respiratory Failure Clinical Indicators per [employer] Training Guide and Quick Reference Guide:  pO2 < 60 mmHg or SpO2 (pulse oximetry  pCO2 > 50 and pH < 7.35  P/F ratio (pO2 / FIO2) < 300  pO2 decrease or pCO2 increase by 10 mmHg from baseline (if known)  Supplemental oxygen of 40% or more  Presence of respiratory distress, tachypnea, dyspnea, shortness of breath, wheezing  Unable to speak in complete sentences  Use of accessory muscles to breathe  Extreme anxiety and feeling of impending doom  Tripod position  Confusion/altered mental status/obtunded  Options provided:  -- Acute Respiratory Failure as evidenced by, Please document evidence. -- Acute Respiratory Failure ruled out after study.  This patient was noted with Acute Hypoxemia only  -- Other - I will add my own diagnosis  -- Disagree - Not applicable / Not valid  -- Disagree - Clinically unable to determine / Unknown  -- Refer to Clinical Documentation Reviewer    PROVIDER RESPONSE TEXT:    Acute Respiratory Failure has been ruled out after study The patient was noted with Acute Hypoxemia only.     Query created by: Bambi Acharya on 2/3/2023 8:32 AM      Electronically signed by:  Dick Shukla MD 2/3/2023 12:19 PM

## 2023-02-04 LAB
BACTERIA SPEC CULT: NORMAL
SERVICE CMNT-IMP: NORMAL

## 2024-08-09 ENCOUNTER — HOSPITAL ENCOUNTER (OUTPATIENT)
Facility: HOSPITAL | Age: 84
Discharge: HOME | End: 2024-08-09
Payer: MEDICARE

## 2024-08-09 ENCOUNTER — HOSPITAL ENCOUNTER (OUTPATIENT)
Facility: HOSPITAL | Age: 84
End: 2024-08-09
Payer: MEDICARE

## 2024-08-09 DIAGNOSIS — F03.90 DEMENTIA, UNSPECIFIED DEMENTIA SEVERITY, UNSPECIFIED DEMENTIA TYPE, UNSPECIFIED WHETHER BEHAVIORAL, PSYCHOTIC, OR MOOD DISTURBANCE OR ANXIETY (HCC): ICD-10-CM

## 2024-08-09 PROCEDURE — 78814 PET IMAGE W/CT LMTD: CPT

## 2024-08-09 PROCEDURE — 6360000002 HC RX W HCPCS: Performed by: PSYCHIATRY & NEUROLOGY

## 2024-08-09 PROCEDURE — A9586 FLORBETAPIR F18: HCPCS | Performed by: PSYCHIATRY & NEUROLOGY

## 2024-08-09 RX ADMIN — FLORBETAPIR F 18 10 MILLICURIE: 51 INJECTION, SOLUTION INTRAVENOUS at 13:35

## 2025-07-28 ENCOUNTER — APPOINTMENT (OUTPATIENT)
Facility: HOSPITAL | Age: 85
End: 2025-07-28
Payer: MEDICARE

## 2025-07-28 ENCOUNTER — HOSPITAL ENCOUNTER (EMERGENCY)
Facility: HOSPITAL | Age: 85
Discharge: HOME | End: 2025-07-28
Attending: STUDENT IN AN ORGANIZED HEALTH CARE EDUCATION/TRAINING PROGRAM
Payer: MEDICARE

## 2025-07-28 VITALS
DIASTOLIC BLOOD PRESSURE: 78 MMHG | HEART RATE: 70 BPM | SYSTOLIC BLOOD PRESSURE: 117 MMHG | TEMPERATURE: 98.2 F | RESPIRATION RATE: 18 BRPM | OXYGEN SATURATION: 97 %

## 2025-07-28 DIAGNOSIS — R10.30 LOWER ABDOMINAL PAIN: Primary | ICD-10-CM

## 2025-07-28 LAB
ALBUMIN SERPL-MCNC: 4.5 G/DL (ref 3.5–5.2)
ALBUMIN/GLOB SERPL: 1.3 (ref 1.1–2.2)
ALP SERPL-CCNC: 91 U/L (ref 40–129)
ALT SERPL-CCNC: 11 U/L (ref 10–50)
ANION GAP SERPL CALC-SCNC: 12 MMOL/L (ref 2–12)
APPEARANCE UR: CLEAR
AST SERPL-CCNC: 21 U/L (ref 10–50)
BACTERIA URNS QL MICRO: NEGATIVE /HPF
BASOPHILS # BLD: 0.01 K/UL (ref 0–0.1)
BASOPHILS NFR BLD: 0.1 % (ref 0–1)
BILIRUB SERPL-MCNC: 0.4 MG/DL (ref 0.2–1)
BILIRUB UR QL: NEGATIVE
BUN SERPL-MCNC: 33 MG/DL (ref 8–23)
BUN/CREAT SERPL: 23 (ref 12–20)
CALCIUM SERPL-MCNC: 10 MG/DL (ref 8.8–10.2)
CHLORIDE SERPL-SCNC: 108 MMOL/L (ref 98–107)
CO2 SERPL-SCNC: 26 MMOL/L (ref 22–29)
COLOR UR: NORMAL
CREAT SERPL-MCNC: 1.45 MG/DL (ref 0.7–1.2)
DIFFERENTIAL METHOD BLD: ABNORMAL
EOSINOPHIL # BLD: 0.17 K/UL (ref 0–0.4)
EOSINOPHIL NFR BLD: 2 % (ref 0–7)
EPITH CASTS URNS QL MICRO: NORMAL /LPF
ERYTHROCYTE [DISTWIDTH] IN BLOOD BY AUTOMATED COUNT: 13.7 % (ref 11.5–14.5)
GLOBULIN SER CALC-MCNC: 3.5 G/DL (ref 2–4)
GLUCOSE SERPL-MCNC: 92 MG/DL (ref 65–100)
GLUCOSE UR STRIP.AUTO-MCNC: NEGATIVE MG/DL
HCT VFR BLD AUTO: 39.5 % (ref 36.6–50.3)
HGB BLD-MCNC: 13.4 G/DL (ref 12.1–17)
HGB UR QL STRIP: NEGATIVE
IMM GRANULOCYTES # BLD AUTO: 0.02 K/UL (ref 0–0.04)
IMM GRANULOCYTES NFR BLD AUTO: 0.2 % (ref 0–0.5)
KETONES UR QL STRIP.AUTO: NEGATIVE MG/DL
LEUKOCYTE ESTERASE UR QL STRIP.AUTO: NEGATIVE
LIPASE SERPL-CCNC: 36 U/L (ref 13–60)
LYMPHOCYTES # BLD: 2.92 K/UL (ref 0.8–3.5)
LYMPHOCYTES NFR BLD: 33.5 % (ref 12–49)
MAGNESIUM SERPL-MCNC: 2.2 MG/DL (ref 1.6–2.4)
MCH RBC QN AUTO: 34.4 PG (ref 26–34)
MCHC RBC AUTO-ENTMCNC: 33.9 G/DL (ref 30–36.5)
MCV RBC AUTO: 101.3 FL (ref 80–99)
MONOCYTES # BLD: 0.71 K/UL (ref 0–1)
MONOCYTES NFR BLD: 8.2 % (ref 5–13)
NEUTS SEG # BLD: 4.88 K/UL (ref 1.8–8)
NEUTS SEG NFR BLD: 56 % (ref 32–75)
NITRITE UR QL STRIP.AUTO: NEGATIVE
NRBC # BLD: 0 K/UL (ref 0–0.01)
NRBC BLD-RTO: 0 PER 100 WBC
PH UR STRIP: 7 (ref 5–8)
PLATELET # BLD AUTO: 171 K/UL (ref 150–400)
PMV BLD AUTO: 11.9 FL (ref 8.9–12.9)
POTASSIUM SERPL-SCNC: 4.9 MMOL/L (ref 3.5–5.1)
PROT SERPL-MCNC: 8 G/DL (ref 6.4–8.3)
PROT UR STRIP-MCNC: NEGATIVE MG/DL
RBC # BLD AUTO: 3.9 M/UL (ref 4.1–5.7)
RBC #/AREA URNS HPF: NORMAL /HPF (ref 0–5)
SODIUM SERPL-SCNC: 146 MMOL/L (ref 136–145)
SP GR UR REFRACTOMETRY: 1.01 (ref 1–1.03)
SPECIMEN HOLD: NORMAL
URINE CULTURE IF INDICATED: NORMAL
UROBILINOGEN UR QL STRIP.AUTO: 0.2 EU/DL (ref 0.2–1)
WBC # BLD AUTO: 8.7 K/UL (ref 4.1–11.1)
WBC URNS QL MICRO: NORMAL /HPF (ref 0–4)

## 2025-07-28 PROCEDURE — 74176 CT ABD & PELVIS W/O CONTRAST: CPT

## 2025-07-28 PROCEDURE — 85025 COMPLETE CBC W/AUTO DIFF WBC: CPT

## 2025-07-28 PROCEDURE — 80053 COMPREHEN METABOLIC PANEL: CPT

## 2025-07-28 PROCEDURE — 36415 COLL VENOUS BLD VENIPUNCTURE: CPT

## 2025-07-28 PROCEDURE — 83690 ASSAY OF LIPASE: CPT

## 2025-07-28 PROCEDURE — 99284 EMERGENCY DEPT VISIT MOD MDM: CPT

## 2025-07-28 PROCEDURE — 83735 ASSAY OF MAGNESIUM: CPT

## 2025-07-28 PROCEDURE — 81001 URINALYSIS AUTO W/SCOPE: CPT

## 2025-07-28 ASSESSMENT — ENCOUNTER SYMPTOMS
SHORTNESS OF BREATH: 0
ABDOMINAL PAIN: 0
BACK PAIN: 1

## 2025-07-28 ASSESSMENT — PAIN SCALES - GENERAL: PAINLEVEL_OUTOF10: 6

## 2025-07-28 ASSESSMENT — PAIN - FUNCTIONAL ASSESSMENT: PAIN_FUNCTIONAL_ASSESSMENT: 0-10

## 2025-07-28 NOTE — DISCHARGE INSTRUCTIONS
You presented to the ED with initially right lower abdominal pain but now also left lower abdominal pain.  Workup in the ED is reassuring.  CT scan blood work and urine showed no acute process to explain pain.  No signs of appendicitis gallbladder disease kidney stones or urinary tract infection.  Most likely this is a muscle type injury.  Recommend taking Tylenol 1000 mg every 6 hours, using over-the-counter 4% lidocaine patches at area of pain and also trialing heat heating pad.  Avoid heavy lifting or other strenuous exercises that could reinjure the area

## 2025-07-28 NOTE — ED PROVIDER NOTES
[facility]  EMERGENCY DEPARTMENT ENCOUNTER      Pt Name: Andrea Dietz  MRN: [id number]  Birthdate 1940  Date of evaluation: 7/28/2025  Provider: Moisés Villareal DO    CHIEF COMPLAINT       Chief Complaint   Patient presents with    Flank Pain         HISTORY OF PRESENT ILLNESS   (Location/Symptom, Timing/Onset, Context/Setting, Quality, Duration, Modifying Factors, Severity)  Note limiting factors.   This is a 84-year-old male with a history of early onset dementia presents the ED for evaluation of right sided flank pain that is progressed over the last several days, his daughter reports has been grabbing his right flank and reporting pain, has a history of chronic kidney disease dementia and Alzheimer's.  He was treated last week for UTI with antibiotics.  No fevers.            Review of External Medical Records:     Nursing Notes were reviewed.    REVIEW OF SYSTEMS    (2-9 systems for level 4, 10 or more for level 5)     Review of Systems   Constitutional:  Negative for fever.   Respiratory:  Negative for shortness of breath.    Cardiovascular:  Negative for chest pain.   Gastrointestinal:  Negative for abdominal pain.   Genitourinary:  Positive for flank pain.   Musculoskeletal:  Positive for back pain.       Except as noted above the remainder of the review of systems was reviewed and negative.       PAST MEDICAL HISTORY     Past Medical History:   Diagnosis Date    CKD (chronic kidney disease) stage 3, GFR 30-59 ml/min ([facility]) 10/03/2014    Other ill-defined conditions(799.89)     recent cold, taking benadryl for sinus congestion    Tobacco abuse          SURGICAL HISTORY       Past Surgical History:   Procedure Laterality Date    COLONOSCOPY N/A 11/16/2021    COLONOSCOPY performed by Herve Pitts MD at [facility] ENDOSCOPY    HERNIA REPAIR      OTHER SURGICAL HISTORY      colonoscopy    IN UNLISTED PROCEDURE ABDOMEN PERITONEUM & OMENTUM           CURRENT MEDICATIONS

## 2025-07-28 NOTE — ED TRIAGE NOTES
Pt reports to ED w/ cc of right sided flank pain that started over the weekend.     Daughter said patient has been grabbing @ his right side over the weekend and reporting pain    Hx of dementia and alzheimer's. Chronic kidney disease     Last week was taking an abx for a UTI

## (undated) DEVICE — SIMPLICITY FLUFF UNDERPAD 23X36, MODERATE: Brand: SIMPLICITY

## (undated) DEVICE — CUFF RMFG BP INF SZ 11 DISP -- LAWSON OEM ITEM 238915

## (undated) DEVICE — POLYP TRAP: Brand: TRAPEASE®

## (undated) DEVICE — BAG BELONG PT PERS CLEAR HANDL

## (undated) DEVICE — CATH IV AUTOGRD BC PNK 20GA 25 -- INSYTE

## (undated) DEVICE — BAG SPEC BIOHZRD 10 X 10 IN --

## (undated) DEVICE — (D)SENSOR RMFG 02 PULS OXMTR -- DISC BY MFR USE ITEM 133445

## (undated) DEVICE — KIT COLON W/ 1.1OZ LUB AND 2 END

## (undated) DEVICE — CONTAINER SPEC 20 ML LID NEUT BUFF FORMALIN 10 % POLYPR STS

## (undated) DEVICE — SNARE ENDOSCP M L240CM W27MM SHTH DIA2.4MM CHN 2.8MM OVL

## (undated) DEVICE — Device

## (undated) DEVICE — SOLIDIFIER MEDC 1200ML -- CONVERT TO 356117

## (undated) DEVICE — 1200 GUARD II KIT W/5MM TUBE W/O VAC TUBE: Brand: GUARDIAN

## (undated) DEVICE — SET ADMIN 16ML TBNG L100IN 2 Y INJ SITE IV PIGGY BK DISP

## (undated) DEVICE — ELECTRODE,RADIOTRANSLUCENT,FOAM,3PK: Brand: MEDLINE

## (undated) DEVICE — 3M™ CUROS™ DISINFECTING CAP FOR NEEDLELESS CONNECTORS 270/CARTON 20 CARTONS/CASE CFF1-270: Brand: CUROS™